# Patient Record
Sex: FEMALE | Race: WHITE | NOT HISPANIC OR LATINO | Employment: FULL TIME | URBAN - METROPOLITAN AREA
[De-identification: names, ages, dates, MRNs, and addresses within clinical notes are randomized per-mention and may not be internally consistent; named-entity substitution may affect disease eponyms.]

---

## 2018-01-11 LAB
INFLUENZA A ANTIGEN, POC: NEGATIVE
INFLUENZA B ANTIGEN, POC: NEGATIVE

## 2021-09-08 ENCOUNTER — HISTORICAL (OUTPATIENT)
Dept: ADMINISTRATIVE | Facility: HOSPITAL | Age: 31
End: 2021-09-08

## 2021-09-08 LAB — SARS-COV-2 RNA RESP QL NAA+PROBE: NEGATIVE

## 2022-04-11 ENCOUNTER — HISTORICAL (OUTPATIENT)
Dept: ADMINISTRATIVE | Facility: HOSPITAL | Age: 32
End: 2022-04-11

## 2022-04-25 VITALS
BODY MASS INDEX: 26.66 KG/M2 | HEIGHT: 65 IN | WEIGHT: 160 LBS | SYSTOLIC BLOOD PRESSURE: 134 MMHG | OXYGEN SATURATION: 100 % | DIASTOLIC BLOOD PRESSURE: 83 MMHG

## 2022-09-23 ENCOUNTER — HISTORICAL (OUTPATIENT)
Dept: ADMINISTRATIVE | Facility: HOSPITAL | Age: 32
End: 2022-09-23

## 2023-11-17 DIAGNOSIS — Z91.89 AT HIGH RISK FOR BREAST CANCER: Primary | ICD-10-CM

## 2023-12-22 RX ORDER — SERTRALINE HYDROCHLORIDE 50 MG/1
50 TABLET, FILM COATED ORAL
COMMUNITY

## 2023-12-22 RX ORDER — LISDEXAMFETAMINE DIMESYLATE 40 MG/1
40 CAPSULE ORAL EVERY MORNING
COMMUNITY

## 2023-12-27 NOTE — PROGRESS NOTES
[facility] Breast Surg  Breast Surgical Oncology  New Patient Office Visit - H&P      Referring Provider: Dr. Kenzie Martinez  PCP: Kenzie Martinez MD   Care Team:  OBGYN: No data on file.    Chief Complaint:   Chief Complaint   Patient presents with    Genetic Evaluation     Patient report no breast related concerns         Subjective:     HPI:  Gretchen Ron is a 33 y.o. female who presents on 2023 for evaluation of risk for breast cancer. Based on the Tyrer-Cuzick Breast Cancer Risk Model, her lifetime risk is calculated to be 26.0%.    Patient is doing well today. She currently denies any breast issues including rashes, redness, pain, swelling, nipple discharge, or new lumps/masses. She has never had any type of screening done for breast cancer thus far.  Patient has been genetically tested over the summer and results were negative.  Patient states she does not perform self-breast exams regularly.  Patient has regular menstrual cycles. She is currently trying to get pregnant.  Patient states she lives a healthy, active lifestyle.  She does not smoke and she drinks alcohol on occasion. Patient works as a teacher.     Imagin2021 BL Breast US at [facility] - negative no evidence of malignancy.  BI-RADS 1.     Pathology:   none    OB/GYN History:  Age at Menarche Onset: 11  Menopausal Status: premenopausal, LMP: Patient's last menstrual period was 12/15/2023 (exact date).  Hysterectomy/Oophorectomy:  None  Hormonal birth control (duration):  8 years  Pregnancy History:   Age at first live birth: 30  Hormone Replacement Therapy: No,  none    Other:  MG breast density: No breast composition recorded.   Prior thoracic RT: none  Genetic testing: Yes. Negative.   Ashkenazi Nondenominational descent: No    Family History:  History reviewed. No pertinent family history.     Patient History:  Past Medical History:   Diagnosis Date    ADHD (attention deficit hyperactivity disorder)   "   Anxiety        History reviewed. No pertinent surgical history.    Social History     Socioeconomic History    Marital status:    Tobacco Use    Smoking status: Never    Smokeless tobacco: Never         There is no immunization history on file for this patient.    Medications/Allergies:    Current Outpatient Medications:     lisdexamfetamine (VYVANSE) 40 MG Cap, Take 40 mg by mouth every morning., Disp: , Rfl:     sertraline (ZOLOFT) 50 MG tablet, Take 50 mg by mouth., Disp: , Rfl:      Review of patient's allergies indicates:   Allergen Reactions    Cefixime Hives    Clarithromycin Hives    Penicillins Hives    Shrimp Hives    Sulfa (sulfonamide antibiotics)        Review of Systems:  All pertinent history in HPI.      Objective:     Vitals:  Vitals:    12/28/23 1017   BP: 126/82   BP Location: Right arm   Patient Position: Sitting   BP Method: Small (Automatic)   Pulse: 76   Resp: 18   Temp: 98.1 °F (36.7 °C)   TempSrc: Oral   SpO2: 100%   Weight: 65.2 kg (143 lb 12.8 oz)   Height: 5' 4" (1.626 m)       Body mass index is 24.68 kg/m².     Physical Exam:  General: The patient is awake, alert and oriented times three. The patient is well nourished and in no acute distress.  Neck: There is no evidence of palpable cervical, supraclavicular or axillary adenopathy. The neck is supple. The thyroid is not enlarged.  Musculoskeletal: The patient has a normal range of motion of her bilateral upper extremities.  Chest: Examination of the chest wall fails to reveal any obvious abnormalities.  The lungs are clear to auscultation bilaterally without rales, rhonchi, or wheezing.  Cardiovascular: The heart has a regular rate and rhythm without murmurs, gallops or rubs.  Breast:   Right:  Examination of right breast fails to reveal any dominant masses or areas of significant focal nodularity. The nipple is everted without evidence of discharge. There is no skin dimpling with movement of the pectoralis. There is no " significant skin changes overlying the breast.   Left:  Examination of the left breast fails to reveal any dominant masses or areas of significant focal nodularity. The nipple is everted without evidence of discharge. There is no skin dimpling with movement of the pectoralis. There are no significant skin changes overlying the breast.  Abdomen: The abdomen is soft, flat, nontender and nondistended with no palpable masses or organomegaly.  Integumentary: no rashes or skin lesions present  Neurologic: cranial nerves intact, no signs of peripheral neurological deficit, motor/sensory function intact    Assessment:     There is no problem list on file for this patient.       Gretchen was seen today for genetic evaluation.    Diagnoses and all orders for this visit:    At high risk for breast cancer  -     Ambulatory referral/consult to Breast Surgery  -     Mammo Digital Screening Bilat w/ Del; Future    Screening mammogram for breast cancer  -     Mammo Digital Screening Bilat w/ Del; Future    Family history of breast cancer  -     Mammo Digital Screening Bilat w/ Del; Future           --------------------------------------------------------------------------------------------------------------  After the initial clinical evaluation nearly 30 minutes were on counseling the patients regarding the options for management. Risk reduction strategies were discussed.     1. Lifestyle factors: As with other types of cancer, studies continue to show that various lifestyle factors may contribute to the development of breast cancer.     Weight: Recent studies have shown that postmenopausal women who are overweight or obese have an increased risk of breast cancer. These women also have a higher risk of having the cancer come back after treatment.     Physical activity: Decreased physical activity is associated with an increased risk of developing breast cancer and a higher risk of having the cancer come back after treatment.  "Regular physical activity may protect against breast cancer by helping women maintain a healthy body weight, lowering hormone levels, or causing changes in a womens metabolism or immune factors.     Alcohol: Current research suggests that having more than 1 to 2 alcoholic drinks, including beer, wine, and spirits, per day raises the risk of breast cancer, as well as the risk of having the cancer come back after treatment.     Food: There is no reliable research that confirms that eating or avoiding specific foods reduces the risk of developing breast cancer or having the cancer come back after treatment. However, eating more fruits and vegetables and fewer animal fats is linked with many health benefits.     2. Prevention:  Surgery to lower cancer risk: For women with BRCA1 or BRCA2 genetic mutations, which substantially increase the risk of breast cancer, preventive removal of the breasts may be considered. The procedure, called a prophylactic mastectomy, appears to reduce the risk of developing breast cancer by at least 95%. Women with these mutations should also consider the preventive removal of the ovaries and fallopian tubes, called a prophylactic salpingo-oophorectomy. This procedure can reduce the risk of developing ovarian cancer, as well as breast cancer, by stopping the ovaries from making estrogen.      Drugs to lower cancer risk (Chemoprevention): Women who have a higher than usual risk of developing breast cancer may consider certain drugs that may help prevent breast cancer. This approach may also be called "chemoprevention." For breast cancer, this is the use of hormone-blocking drugs to reduce cancer risk. The drugs, tamoxifen (Soltamox) and raloxifene (Evista), are approved by the U.S. Food and Drug Administration (FDA) to lower breast cancer risk. These drugs are called selective estrogen receptor modulators (SERMs) and are not chemotherapy. A SERM is a medication that blocks estrogen receptors " in some tissues and not others. Both women who have gone through menopause and those who have not may take tamoxifen. Raloxifene is only approved for women who have gone through menopause. Each drug also has different side effects.     Aromatase inhibitors (AIs) have also been shown to lower breast cancer risk. AIs are a type of hormone-blocking treatment that reduces the amount of estrogen in a woman's body by stopping tissues and organs other than the ovaries from producing estrogen. They can only be used by women who have gone through menopause. However, no AIs have been approved by the FDA for lowering breast cancer risk in women who do not have the disease.     3. Surveillance: Women with a known genetic mutation should follow screening guidelines per the NCCN guidelines. Women with no known genetic mutation  and found to be at greater than 20 percent average lifetime risk of breast cancer are recommended for the following screening recommendations:     Clinical Breast exam: Every 6-12 months starting at age found to be at increased risk by risk model     Mammogram: Per NCCN guidelines, recommended every year starting 10 years younger than the youngest breast cancer case in the family (but not before age 30). May consider beginning breast MRIs at age 30 per ACR guidelines if desired by patient or other clinical considerations. May also consider getting a baseline MG at time of initial high risk consultation, if not already obtained.     Breast MRI: Per NCCN guidelines, recommended every year starting 10 years younger than the youngest breast cancer case in the family (but not before age 25). May consider beginning breast MRIs at age 30 per ACR guidelines if desired by patient or other clinical considerations. If patient has a first-degree relative with a BRCA1/2 gene mutation, youre encouraged to get genetic counseling and/or testing before getting MRI as part of screening (for those who do not wish to have  genetic testing, MRI is recommended). Breast MRI in combination with mammography is better than mammography alone at finding breast cancer in certain women at higher than average risk.     --------------------------------------------------------------------------------------------------------------     Plan:         1. Lifestyle - Healthy lifestyle guidelines were reviewed. She was encouraged to engage in regular exercise, maintain a healthy body weight, and avoid excessive alcohol consumption. Healthy nutritional guidelines were also discussed. Self-breast examination was reviewed with the patient in detail and she was encouraged to perform this on a monthly basis.    2. Surveillance - She desires undergoing high risk screening with annual screening mammograms and breast MRIs.  Patient had a maternal aunt who was 35 at the time of breast cancer diagnosis. Thus, according to NCCN guidelines, patient should start undergoing screening now.  In the absence of significant clinical findings in the interval, I recommend screening mammogram next available.  Patient is actively trying to get pregnant, so we will hold off on proceeding with breast MRIs for the time being.     3. Follow up - RTC in 1 year.     4. Prevention - We had a brief discussion/education about indications for preventative mastectomy or chemoprevention.  These methods are not recommended to her at this time.    5. Genetics - She has already had genetic testing done, and it was negative.     6. Other routine screening exams:   -  Recommend annual follow up with PCP   -  Recommend annual follow up with GYN for pap smears/gynecologic exams and CBE.  Patient has her annual gyn visit in the summer, so she will continue to see us in the winter in order to keep CBE q 6 months schedule.        All of her questions were answered. She was advised to call if she develops any questions or concerns.    Gretchen Thomas PA-C      --------------------------------------------------------------------------------------------------------------  Total time on the date of the visit ranged from 60-74 mins (23510). Total time includes both face-to-face and non-face-to-face time personally spent by myself on the day of the visit.    Non-face-to-face time included:  _X_ preparing to see the patient such as reviewing the patient record  _X_ obtaining and reviewing separately obtained history  _X_ independently interpreting results  _X_ documenting clinical information in electronic health record.    Face-to-face time included:  _X_ performing an appropriate history and examination  _X_ communicating results to the patient  _X_ counseling and educating the patient  __ ordering appropriate medications  _x_ ordering appropriate tests  _X_ ordering appropriate procedures (including follow-up)  _X_ answering any questions the patient had    Total Time spent on date of visit: 60 minutes

## 2023-12-28 ENCOUNTER — OFFICE VISIT (OUTPATIENT)
Dept: SURGERY | Facility: CLINIC | Age: 33
End: 2023-12-28
Payer: COMMERCIAL

## 2023-12-28 VITALS
SYSTOLIC BLOOD PRESSURE: 126 MMHG | RESPIRATION RATE: 18 BRPM | TEMPERATURE: 98 F | DIASTOLIC BLOOD PRESSURE: 82 MMHG | HEART RATE: 76 BPM | OXYGEN SATURATION: 100 % | BODY MASS INDEX: 24.55 KG/M2 | HEIGHT: 64 IN | WEIGHT: 143.81 LBS

## 2023-12-28 DIAGNOSIS — Z91.89 AT HIGH RISK FOR BREAST CANCER: Primary | ICD-10-CM

## 2023-12-28 DIAGNOSIS — Z12.31 SCREENING MAMMOGRAM FOR BREAST CANCER: ICD-10-CM

## 2023-12-28 DIAGNOSIS — Z80.3 FAMILY HISTORY OF BREAST CANCER: ICD-10-CM

## 2023-12-28 PROCEDURE — 3074F SYST BP LT 130 MM HG: CPT | Mod: CPTII,S$GLB,,

## 2023-12-28 PROCEDURE — 3079F PR MOST RECENT DIASTOLIC BLOOD PRESSURE 80-89 MM HG: ICD-10-PCS | Mod: CPTII,S$GLB,,

## 2023-12-28 PROCEDURE — 3079F DIAST BP 80-89 MM HG: CPT | Mod: CPTII,S$GLB,,

## 2023-12-28 PROCEDURE — 99999 PR PBB SHADOW E&M-EST. PATIENT-LVL IV: ICD-10-PCS | Mod: PBBFAC,,,

## 2023-12-28 PROCEDURE — 3008F PR BODY MASS INDEX (BMI) DOCUMENTED: ICD-10-PCS | Mod: CPTII,S$GLB,,

## 2023-12-28 PROCEDURE — 3074F PR MOST RECENT SYSTOLIC BLOOD PRESSURE < 130 MM HG: ICD-10-PCS | Mod: CPTII,S$GLB,,

## 2023-12-28 PROCEDURE — 1160F PR REVIEW ALL MEDS BY PRESCRIBER/CLIN PHARMACIST DOCUMENTED: ICD-10-PCS | Mod: CPTII,S$GLB,,

## 2023-12-28 PROCEDURE — 1160F RVW MEDS BY RX/DR IN RCRD: CPT | Mod: CPTII,S$GLB,,

## 2023-12-28 PROCEDURE — 99205 PR OFFICE/OUTPT VISIT, NEW, LEVL V, 60-74 MIN: ICD-10-PCS | Mod: S$GLB,,,

## 2023-12-28 PROCEDURE — 3008F BODY MASS INDEX DOCD: CPT | Mod: CPTII,S$GLB,,

## 2023-12-28 PROCEDURE — 1159F MED LIST DOCD IN RCRD: CPT | Mod: CPTII,S$GLB,,

## 2023-12-28 PROCEDURE — 99999 PR PBB SHADOW E&M-EST. PATIENT-LVL IV: CPT | Mod: PBBFAC,,,

## 2023-12-28 PROCEDURE — 99205 OFFICE O/P NEW HI 60 MIN: CPT | Mod: S$GLB,,,

## 2023-12-28 PROCEDURE — 1159F PR MEDICATION LIST DOCUMENTED IN MEDICAL RECORD: ICD-10-PCS | Mod: CPTII,S$GLB,,

## 2024-06-05 LAB
C TRACH RRNA SPEC QL PROBE: NEGATIVE
HBV SURFACE AG SERPL QL IA: NEGATIVE
HCV AB SERPL QL IA: NEGATIVE
HIV 1+2 AB+HIV1 P24 AG SERPL QL IA: NEGATIVE
N GONORRHOEAE, AMPLIFIED DNA: NEGATIVE
RPR: NONREACTIVE
RUBELLA IMMUNE STATUS: NORMAL

## 2024-10-27 ENCOUNTER — HOSPITAL ENCOUNTER (OUTPATIENT)
Facility: HOSPITAL | Age: 34
Discharge: HOME | End: 2024-10-28
Attending: CHIROPRACTOR | Admitting: OBSTETRICS & GYNECOLOGY
Payer: COMMERCIAL

## 2024-10-27 DIAGNOSIS — O47.9 UTERINE CONTRACTIONS: ICD-10-CM

## 2024-10-27 DIAGNOSIS — R87.89 POSITIVE FETAL FIBRONECTIN AT 22 WEEKS TO 34 WEEKS GESTATION: ICD-10-CM

## 2024-10-27 DIAGNOSIS — Z3A.29 29 WEEKS GESTATION OF PREGNANCY: Primary | ICD-10-CM

## 2024-10-27 DIAGNOSIS — O09.899 POSITIVE FETAL FIBRONECTIN AT 22 WEEKS TO 34 WEEKS GESTATION: ICD-10-CM

## 2024-10-27 PROBLEM — O60.00 PRETERM LABOR: Status: ACTIVE | Noted: 2024-10-27

## 2024-10-27 LAB
BACTERIA #/AREA URNS AUTO: ABNORMAL /HPF
BASOPHILS # BLD AUTO: 0.04 X10(3)/MCL
BASOPHILS NFR BLD AUTO: 0.3 %
BILIRUB UR QL STRIP.AUTO: NEGATIVE
CLARITY UR: ABNORMAL
COLOR UR AUTO: ABNORMAL
EOSINOPHIL # BLD AUTO: 0.02 X10(3)/MCL (ref 0–0.9)
EOSINOPHIL NFR BLD AUTO: 0.1 %
ERYTHROCYTE [DISTWIDTH] IN BLOOD BY AUTOMATED COUNT: 12.1 % (ref 11.5–17)
FETAL FIBRONECTIN (OHS): POSITIVE
GLUCOSE UR QL STRIP: NORMAL
GROUP & RH: NORMAL
HCT VFR BLD AUTO: 31.9 % (ref 37–47)
HGB BLD-MCNC: 10.9 G/DL (ref 12–16)
HGB UR QL STRIP: ABNORMAL
IMM GRANULOCYTES # BLD AUTO: 0.15 X10(3)/MCL (ref 0–0.04)
IMM GRANULOCYTES NFR BLD AUTO: 1.1 %
INDIRECT COOMBS: NORMAL
KETONES UR QL STRIP: NEGATIVE
LEUKOCYTE ESTERASE UR QL STRIP: 250
LYMPHOCYTES # BLD AUTO: 1.51 X10(3)/MCL (ref 0.6–4.6)
LYMPHOCYTES NFR BLD AUTO: 10.8 %
MCH RBC QN AUTO: 30.1 PG (ref 27–31)
MCHC RBC AUTO-ENTMCNC: 34.2 G/DL (ref 33–36)
MCV RBC AUTO: 88.1 FL (ref 80–94)
MONOCYTES # BLD AUTO: 0.89 X10(3)/MCL (ref 0.1–1.3)
MONOCYTES NFR BLD AUTO: 6.4 %
MUCOUS THREADS URNS QL MICRO: ABNORMAL /LPF
NEUTROPHILS # BLD AUTO: 11.35 X10(3)/MCL (ref 2.1–9.2)
NEUTROPHILS NFR BLD AUTO: 81.3 %
NITRITE UR QL STRIP: NEGATIVE
NRBC BLD AUTO-RTO: 0 %
PH UR STRIP: 6.5 [PH]
PLATELET # BLD AUTO: 170 X10(3)/MCL (ref 130–400)
PMV BLD AUTO: 10.4 FL (ref 7.4–10.4)
PROT UR QL STRIP: NEGATIVE
RBC # BLD AUTO: 3.62 X10(6)/MCL (ref 4.2–5.4)
RBC #/AREA URNS AUTO: ABNORMAL /HPF
SP GR UR STRIP.AUTO: 1.01 (ref 1–1.03)
SPECIMEN OUTDATE: NORMAL
SQUAMOUS #/AREA URNS LPF: ABNORMAL /HPF
T PALLIDUM AB SER QL: NONREACTIVE
UROBILINOGEN UR STRIP-ACNC: NORMAL
WBC # BLD AUTO: 13.96 X10(3)/MCL (ref 4.5–11.5)
WBC #/AREA URNS AUTO: ABNORMAL /HPF

## 2024-10-27 PROCEDURE — 63600175 PHARM REV CODE 636 W HCPCS: Performed by: OBSTETRICS & GYNECOLOGY

## 2024-10-27 PROCEDURE — 36415 COLL VENOUS BLD VENIPUNCTURE: CPT | Performed by: OBSTETRICS & GYNECOLOGY

## 2024-10-27 PROCEDURE — 82731 ASSAY OF FETAL FIBRONECTIN: CPT | Performed by: OBSTETRICS & GYNECOLOGY

## 2024-10-27 PROCEDURE — 86780 TREPONEMA PALLIDUM: CPT | Performed by: OBSTETRICS & GYNECOLOGY

## 2024-10-27 PROCEDURE — 86850 RBC ANTIBODY SCREEN: CPT | Performed by: OBSTETRICS & GYNECOLOGY

## 2024-10-27 PROCEDURE — 81001 URINALYSIS AUTO W/SCOPE: CPT | Performed by: OBSTETRICS & GYNECOLOGY

## 2024-10-27 PROCEDURE — 85025 COMPLETE CBC W/AUTO DIFF WBC: CPT | Performed by: OBSTETRICS & GYNECOLOGY

## 2024-10-27 PROCEDURE — G0378 HOSPITAL OBSERVATION PER HR: HCPCS

## 2024-10-27 PROCEDURE — 51702 INSERT TEMP BLADDER CATH: CPT

## 2024-10-27 PROCEDURE — 86901 BLOOD TYPING SEROLOGIC RH(D): CPT | Performed by: OBSTETRICS & GYNECOLOGY

## 2024-10-27 PROCEDURE — 86900 BLOOD TYPING SEROLOGIC ABO: CPT | Performed by: OBSTETRICS & GYNECOLOGY

## 2024-10-27 PROCEDURE — 25000003 PHARM REV CODE 250: Performed by: OBSTETRICS & GYNECOLOGY

## 2024-10-27 PROCEDURE — 96372 THER/PROPH/DIAG INJ SC/IM: CPT | Performed by: OBSTETRICS & GYNECOLOGY

## 2024-10-27 RX ORDER — CALCIUM GLUCONATE 98 MG/ML
1 INJECTION, SOLUTION INTRAVENOUS
Status: DISCONTINUED | OUTPATIENT
Start: 2024-10-27 | End: 2024-10-28 | Stop reason: HOSPADM

## 2024-10-27 RX ORDER — MAGNESIUM SULFATE HEPTAHYDRATE 40 MG/ML
1 INJECTION, SOLUTION INTRAVENOUS CONTINUOUS
Status: DISCONTINUED | OUTPATIENT
Start: 2024-10-27 | End: 2024-10-28

## 2024-10-27 RX ORDER — ONDANSETRON HYDROCHLORIDE 2 MG/ML
4 INJECTION, SOLUTION INTRAVENOUS EVERY 6 HOURS PRN
Status: DISCONTINUED | OUTPATIENT
Start: 2024-10-27 | End: 2024-10-28 | Stop reason: HOSPADM

## 2024-10-27 RX ORDER — MAGNESIUM SULFATE HEPTAHYDRATE 40 MG/ML
6 INJECTION, SOLUTION INTRAVENOUS ONCE
Status: COMPLETED | OUTPATIENT
Start: 2024-10-27 | End: 2024-10-27

## 2024-10-27 RX ORDER — BETAMETHASONE SODIUM PHOSPHATE AND BETAMETHASONE ACETATE 3; 3 MG/ML; MG/ML
12 INJECTION, SUSPENSION INTRA-ARTICULAR; INTRALESIONAL; INTRAMUSCULAR; SOFT TISSUE ONCE
Status: DISCONTINUED | OUTPATIENT
Start: 2024-10-28 | End: 2024-10-28

## 2024-10-27 RX ORDER — NIFEDIPINE 10 MG/1
20 CAPSULE ORAL EVERY 8 HOURS
Status: DISCONTINUED | OUTPATIENT
Start: 2024-10-27 | End: 2024-10-27

## 2024-10-27 RX ORDER — SODIUM CHLORIDE, SODIUM LACTATE, POTASSIUM CHLORIDE, CALCIUM CHLORIDE 600; 310; 30; 20 MG/100ML; MG/100ML; MG/100ML; MG/100ML
1000 INJECTION, SOLUTION INTRAVENOUS CONTINUOUS
Status: DISCONTINUED | OUTPATIENT
Start: 2024-10-27 | End: 2024-10-28 | Stop reason: HOSPADM

## 2024-10-27 RX ORDER — ZOLPIDEM TARTRATE 5 MG/1
10 TABLET ORAL NIGHTLY PRN
Status: DISCONTINUED | OUTPATIENT
Start: 2024-10-27 | End: 2024-10-28 | Stop reason: HOSPADM

## 2024-10-27 RX ORDER — NIFEDIPINE 10 MG/1
20 CAPSULE ORAL EVERY 8 HOURS
Status: DISCONTINUED | OUTPATIENT
Start: 2024-10-28 | End: 2024-10-28 | Stop reason: HOSPADM

## 2024-10-27 RX ORDER — BETAMETHASONE SODIUM PHOSPHATE AND BETAMETHASONE ACETATE 3; 3 MG/ML; MG/ML
12 INJECTION, SUSPENSION INTRA-ARTICULAR; INTRALESIONAL; INTRAMUSCULAR; SOFT TISSUE ONCE
Status: COMPLETED | OUTPATIENT
Start: 2024-10-27 | End: 2024-10-27

## 2024-10-27 RX ORDER — SODIUM CHLORIDE, SODIUM LACTATE, POTASSIUM CHLORIDE, CALCIUM CHLORIDE 600; 310; 30; 20 MG/100ML; MG/100ML; MG/100ML; MG/100ML
INJECTION, SOLUTION INTRAVENOUS CONTINUOUS
Status: DISCONTINUED | OUTPATIENT
Start: 2024-10-27 | End: 2024-10-28 | Stop reason: HOSPADM

## 2024-10-27 RX ADMIN — BETAMETHASONE SODIUM PHOSPHATE AND BETAMETHASONE ACETATE 12 MG: 3; 3 INJECTION, SUSPENSION INTRA-ARTICULAR; INTRALESIONAL; INTRAMUSCULAR at 05:10

## 2024-10-27 RX ADMIN — ZOLPIDEM TARTRATE 10 MG: 5 TABLET ORAL at 10:10

## 2024-10-27 RX ADMIN — NIFEDIPINE 20 MG: 10 CAPSULE ORAL at 06:10

## 2024-10-27 RX ADMIN — MAGNESIUM SULFATE HEPTAHYDRATE 6 G: 40 INJECTION, SOLUTION INTRAVENOUS at 05:10

## 2024-10-27 RX ADMIN — ONDANSETRON 4 MG: 2 INJECTION INTRAMUSCULAR; INTRAVENOUS at 05:10

## 2024-10-27 RX ADMIN — SODIUM CHLORIDE, POTASSIUM CHLORIDE, SODIUM LACTATE AND CALCIUM CHLORIDE: 600; 310; 30; 20 INJECTION, SOLUTION INTRAVENOUS at 05:10

## 2024-10-28 VITALS
HEART RATE: 78 BPM | SYSTOLIC BLOOD PRESSURE: 116 MMHG | OXYGEN SATURATION: 100 % | RESPIRATION RATE: 17 BRPM | BODY MASS INDEX: 29.16 KG/M2 | HEIGHT: 65 IN | DIASTOLIC BLOOD PRESSURE: 57 MMHG | WEIGHT: 175 LBS | TEMPERATURE: 98 F

## 2024-10-28 PROBLEM — Z3A.29 29 WEEKS GESTATION OF PREGNANCY: Status: ACTIVE | Noted: 2024-10-28

## 2024-10-28 LAB — MAGNESIUM SERPL-MCNC: 3.6 MG/DL (ref 1.6–2.6)

## 2024-10-28 PROCEDURE — 63600175 PHARM REV CODE 636 W HCPCS: Performed by: OBSTETRICS & GYNECOLOGY

## 2024-10-28 PROCEDURE — 83735 ASSAY OF MAGNESIUM: CPT | Performed by: OBSTETRICS & GYNECOLOGY

## 2024-10-28 PROCEDURE — 96372 THER/PROPH/DIAG INJ SC/IM: CPT | Performed by: OBSTETRICS & GYNECOLOGY

## 2024-10-28 PROCEDURE — G0378 HOSPITAL OBSERVATION PER HR: HCPCS

## 2024-10-28 RX ORDER — BETAMETHASONE SODIUM PHOSPHATE AND BETAMETHASONE ACETATE 3; 3 MG/ML; MG/ML
12 INJECTION, SUSPENSION INTRA-ARTICULAR; INTRALESIONAL; INTRAMUSCULAR; SOFT TISSUE ONCE
Status: COMPLETED | OUTPATIENT
Start: 2024-10-28 | End: 2024-10-28

## 2024-10-28 RX ADMIN — BETAMETHASONE SODIUM PHOSPHATE AND BETAMETHASONE ACETATE 12 MG: 3; 3 INJECTION, SUSPENSION INTRA-ARTICULAR; INTRALESIONAL; INTRAMUSCULAR at 01:10

## 2024-10-30 ENCOUNTER — HOSPITAL ENCOUNTER (INPATIENT)
Facility: HOSPITAL | Age: 34
LOS: 7 days | Discharge: HOME | End: 2024-11-08
Attending: OBSTETRICS & GYNECOLOGY | Admitting: OBSTETRICS & GYNECOLOGY
Payer: COMMERCIAL

## 2024-10-30 DIAGNOSIS — R87.89 POSITIVE FETAL FIBRONECTIN AT 22 WEEKS TO 34 WEEKS GESTATION: ICD-10-CM

## 2024-10-30 DIAGNOSIS — O26.879 SHORT CERVIX AFFECTING PREGNANCY: ICD-10-CM

## 2024-10-30 DIAGNOSIS — Z3A.29 29 WEEKS GESTATION OF PREGNANCY: ICD-10-CM

## 2024-10-30 DIAGNOSIS — O60.00 PRETERM LABOR: ICD-10-CM

## 2024-10-30 DIAGNOSIS — O09.899 POSITIVE FETAL FIBRONECTIN AT 22 WEEKS TO 34 WEEKS GESTATION: ICD-10-CM

## 2024-10-30 DIAGNOSIS — O36.8390 NON-REASSURING FETAL HEART RATE OR RHYTHM AFFECTING MOTHER: ICD-10-CM

## 2024-10-30 DIAGNOSIS — R06.02 SHORTNESS OF BREATH: ICD-10-CM

## 2024-10-30 DIAGNOSIS — O42.919 PRETERM PREMATURE RUPTURE OF MEMBRANES: ICD-10-CM

## 2024-10-30 LAB
BASOPHILS # BLD AUTO: 0.03 X10(3)/MCL
BASOPHILS NFR BLD AUTO: 0.2 %
EOSINOPHIL # BLD AUTO: 0.02 X10(3)/MCL (ref 0–0.9)
EOSINOPHIL NFR BLD AUTO: 0.1 %
ERYTHROCYTE [DISTWIDTH] IN BLOOD BY AUTOMATED COUNT: 12.1 % (ref 11.5–17)
GROUP & RH: NORMAL
HCT VFR BLD AUTO: 32.6 % (ref 37–47)
HGB BLD-MCNC: 10.8 G/DL (ref 12–16)
IMM GRANULOCYTES # BLD AUTO: 0.27 X10(3)/MCL (ref 0–0.04)
IMM GRANULOCYTES NFR BLD AUTO: 2 %
INDIRECT COOMBS: NORMAL
LYMPHOCYTES # BLD AUTO: 1.68 X10(3)/MCL (ref 0.6–4.6)
LYMPHOCYTES NFR BLD AUTO: 12.6 %
MCH RBC QN AUTO: 29.4 PG (ref 27–31)
MCHC RBC AUTO-ENTMCNC: 33.1 G/DL (ref 33–36)
MCV RBC AUTO: 88.8 FL (ref 80–94)
MONOCYTES # BLD AUTO: 0.8 X10(3)/MCL (ref 0.1–1.3)
MONOCYTES NFR BLD AUTO: 6 %
NEUTROPHILS # BLD AUTO: 10.54 X10(3)/MCL (ref 2.1–9.2)
NEUTROPHILS NFR BLD AUTO: 79.1 %
NRBC BLD AUTO-RTO: 0 %
PLATELET # BLD AUTO: 199 X10(3)/MCL (ref 130–400)
PMV BLD AUTO: 10.8 FL (ref 7.4–10.4)
RBC # BLD AUTO: 3.67 X10(6)/MCL (ref 4.2–5.4)
SPECIMEN OUTDATE: NORMAL
T PALLIDUM AB SER QL: NONREACTIVE
WBC # BLD AUTO: 13.34 X10(3)/MCL (ref 4.5–11.5)

## 2024-10-30 PROCEDURE — 86900 BLOOD TYPING SEROLOGIC ABO: CPT | Performed by: OBSTETRICS & GYNECOLOGY

## 2024-10-30 PROCEDURE — 85025 COMPLETE CBC W/AUTO DIFF WBC: CPT | Performed by: OBSTETRICS & GYNECOLOGY

## 2024-10-30 PROCEDURE — 99233 SBSQ HOSP IP/OBS HIGH 50: CPT | Mod: ,,, | Performed by: NURSE PRACTITIONER

## 2024-10-30 PROCEDURE — 63600175 PHARM REV CODE 636 W HCPCS: Performed by: OBSTETRICS & GYNECOLOGY

## 2024-10-30 PROCEDURE — 51702 INSERT TEMP BLADDER CATH: CPT

## 2024-10-30 PROCEDURE — 86780 TREPONEMA PALLIDUM: CPT | Performed by: OBSTETRICS & GYNECOLOGY

## 2024-10-30 PROCEDURE — G0378 HOSPITAL OBSERVATION PER HR: HCPCS

## 2024-10-30 PROCEDURE — G0379 DIRECT REFER HOSPITAL OBSERV: HCPCS

## 2024-10-30 RX ORDER — MAGNESIUM SULFATE HEPTAHYDRATE 40 MG/ML
6 INJECTION, SOLUTION INTRAVENOUS ONCE
Status: DISCONTINUED | OUTPATIENT
Start: 2024-10-30 | End: 2024-10-30

## 2024-10-30 RX ORDER — MAGNESIUM SULFATE HEPTAHYDRATE 40 MG/ML
6 INJECTION, SOLUTION INTRAVENOUS ONCE
Status: COMPLETED | OUTPATIENT
Start: 2024-10-30 | End: 2024-10-30

## 2024-10-30 RX ORDER — MAGNESIUM SULFATE HEPTAHYDRATE 40 MG/ML
4 INJECTION, SOLUTION INTRAVENOUS ONCE
Status: DISCONTINUED | OUTPATIENT
Start: 2024-10-30 | End: 2024-10-30

## 2024-10-30 RX ORDER — MAGNESIUM SULFATE HEPTAHYDRATE 40 MG/ML
2 INJECTION, SOLUTION INTRAVENOUS CONTINUOUS
Status: DISCONTINUED | OUTPATIENT
Start: 2024-10-30 | End: 2024-10-30

## 2024-10-30 RX ORDER — ONDANSETRON HYDROCHLORIDE 2 MG/ML
8 INJECTION, SOLUTION INTRAVENOUS ONCE
Status: COMPLETED | OUTPATIENT
Start: 2024-10-30 | End: 2024-10-30

## 2024-10-30 RX ORDER — CALCIUM GLUCONATE 98 MG/ML
1 INJECTION, SOLUTION INTRAVENOUS
Status: DISCONTINUED | OUTPATIENT
Start: 2024-10-30 | End: 2024-10-31

## 2024-10-30 RX ORDER — MAGNESIUM SULFATE HEPTAHYDRATE 40 MG/ML
2 INJECTION, SOLUTION INTRAVENOUS CONTINUOUS
Status: DISCONTINUED | OUTPATIENT
Start: 2024-10-30 | End: 2024-10-31

## 2024-10-30 RX ADMIN — ONDANSETRON 8 MG: 2 INJECTION INTRAMUSCULAR; INTRAVENOUS at 12:10

## 2024-10-30 RX ADMIN — MAGNESIUM SULFATE HEPTAHYDRATE 6 G: 40 INJECTION, SOLUTION INTRAVENOUS at 12:10

## 2024-10-30 NOTE — CONSULTS
Consultation Note  Maternal Fetal Medicine          Subjective:         Gretchen Ron is a 34 y.o.  female with olson IUP at 29w4d who is admitted for short cervix, contractions.    She reports experiencing contractions over the weekend which led her BHUMIKA. She had a positive FFN, regular contraction pattern noted, and TVU of 3cm. UA collected and did not meet criteria to reflex for culture. She was admitted for a few days where she received neuromagnesium and ANCS. After uterine quiescence, she was discharged to home with close follow up scheduled with her primary OB. She went to her OB today for transvaginal ultrasound. Progressive cervical shortening noted with a CL of 1cm and funneling. Upon further questioning, she reported feeling frequent tightening so she was sent to the hospital for monitoring.    Upon arrival, she was noted to have a regular contraction pattern and neuromag was ordered to be repeated. FHR decels noted at the time of regular contractions with spontaneous recovery. Since Mag initiation, contractions have decreased in frequency. No further decels noted.   She has SCDs on bilat LE.       Review of Systems   Constitutional: Negative for fever, chills, malaise, and fatigue.   HENT: Negative for headache.   Eyes: Negative for visual disturbances.   Respiratory: Negative for cough and shortness of breath.    Cardiovascular: Negative for chest pain, edema.  Gastrointestinal:  Negative for abd pain, N/V/D. Negative for constipation.   Obstetrical: Negative for vaginal leaking/bleeding. Positive for contractions.   Genitourinary: Negative for dysuria, frequency, urgency.   Musculoskeletal: Negative.    Neurological: Negative.    Psychiatric/Behavioral: Negative.        PMHx:   Past Medical History:   Diagnosis Date    ADHD (attention deficit hyperactivity disorder)     Anxiety        PSHx: History reviewed. No pertinent surgical history.    All:   Review of patient's allergies indicates:    Allergen Reactions    Cefixime Hives    Clarithromycin Hives    Penicillins Hives    Shrimp Hives    Sulfa (sulfonamide antibiotics)        Meds:   Medications Prior to Admission   Medication Sig Dispense Refill Last Dose/Taking    prenatal vit/iron fum/folic ac (PRENATAL 1+1 ORAL) Take by mouth.   10/30/2024    sertraline (ZOLOFT) 50 MG tablet Take 50 mg by mouth.   10/30/2024       SH:   Social History     Socioeconomic History    Marital status:    Tobacco Use    Smoking status: Never    Smokeless tobacco: Never   Substance and Sexual Activity    Alcohol use: Not Currently    Drug use: Never    Sexual activity: Yes       FH: No family history on file.    OBHx:   OB History    Para Term  AB Living   3 1 1 0 1 1   SAB IAB Ectopic Multiple Live Births   1 0 0 0 1      # Outcome Date GA Lbr Darren/2nd Weight Sex Type Anes PTL Lv   3 Current            2 Term 23    M Vag-Spont   STEVE   1 SAB 2019     SAB          Objective:         Temp:  [98.8 °F (37.1 °C)] 98.8 °F (37.1 °C)  Pulse:  [72-84] 83  Resp:  [16-18] 18  SpO2:  [99 %-100 %] 99 %  BP: ()/(62-70) 116/63    Gen: NAD, A&Ox3  Pulm: Unlabored breathing, LCTAB  Card: RRR  Abd: FHT present, soft, nondistended, nontender to palpation, gravid uterus palpable c/w gestational age  Extremities: Palpable peripheral pulses, no pedal edema, 2+ DTRs x 4    NST: 140 baseline, moderate BTBV, pos accelerations, neg decelerations currently; had intermittent decels upon arrival when ctx pattern was regular  [place]: 4 in past hour  Limited OB ultrasound on 10/27/24: vertex presentation, anterior placenta, ALANNA 16cm, TVU-CL 3cm    Lab Review  Blood Type: A POS      Recent Results (from the past 24 hours)   SYPHILIS ANTIBODY (WITH REFLEX RPR)    Collection Time: 10/30/24  1:07 PM   Result Value Ref Range    Syphilis Antibody Nonreactive Nonreactive, Equivocal   CBC with Differential    Collection Time: 10/30/24  1:07 PM   Result Value Ref Range    WBC  13.34 (H) 4.50 - 11.50 x10(3)/mcL    RBC 3.67 (L) 4.20 - 5.40 x10(6)/mcL    Hgb 10.8 (L) 12.0 - 16.0 g/dL    Hct 32.6 (L) 37.0 - 47.0 %    MCV 88.8 80.0 - 94.0 fL    MCH 29.4 27.0 - 31.0 pg    MCHC 33.1 33.0 - 36.0 g/dL    RDW 12.1 11.5 - 17.0 %    Platelet 199 130 - 400 x10(3)/mcL    MPV 10.8 (H) 7.4 - 10.4 fL    Neut % 79.1 %    Lymph % 12.6 %    Mono % 6.0 %    Eos % 0.1 %    Basophil % 0.2 %    Lymph # 1.68 0.6 - 4.6 x10(3)/mcL    Neut # 10.54 (H) 2.1 - 9.2 x10(3)/mcL    Mono # 0.80 0.1 - 1.3 x10(3)/mcL    Eos # 0.02 0 - 0.9 x10(3)/mcL    Baso # 0.03 <=0.2 x10(3)/mcL    IG# 0.27 (H) 0 - 0.04 x10(3)/mcL    IG% 2.0 %    NRBC% 0.0 %       Assessment:       34 y.o.  at 29w4d weeks gestation admitted for  contractions    Active Hospital Problems    Diagnosis  POA    *29 weeks gestation of pregnancy [Z3A.29]  Not Applicable    Short cervix affecting pregnancy [O26.879]  Unknown     labor [O60.00]  Yes    Positive fetal fibronectin at 22 weeks to 34 weeks gestation [O09.899, R87.89]  Not Applicable      Resolved Hospital Problems   No resolved problems to display.        Plan:     1.  contractions  Patient is admitted with  contractions. The patient was counseled regarding the need for inpatient management due to regular contraction pattern on admit. We discussed the risk of progression to  labor and ultimately delivery. Additionally, we discussed  complications of delivering at this gestational age.     Recommendations:  Administer a course of steroids for fetal lung maturity (completed)  If gestational age is < 32 weeks and concern for imminent delivery, consider initiating magnesium sulfate for neuroprotection - currently infusing. Has indwelling freeman in place. Ok to discontinue and measure urine output. If d/c Freeman, must use bedside commode next to bed to void.  This is administered with an initial loading dose of 6 grams of magnesium sulfate, followed by an  infusion rate of 2 grams/hour for 12 hours.   Magnesium sulfate should be re-initiated (with a 6 gram loading dose if it has been > 6 hours since discontinuing a prior course) if delivery is thought to be imminent prior to 32 weeks gestation.  May use Nifedipine 10mg PRN for tocolysis. Do not recommend utilizing Indocin at this time.   Obtain estimated fetal weight, assess fetal presentation with ultrasound - will plan for AM  Continuous EFM/[place] at this time.   UA completed three days ago - did not meet criteria for culture. Denies symptoms.    2. FWB  - currently on continuous monitoring d/t neuromag   - Had intermittent decels upon admit, however, reassuring at this time    Thank you for allowing us to participate in the care of your patient. If you have any questions/concerns, please do not hesitate to contact us.       Maryann Castro, MSN, APRN, WHNP-BC  Maternal Fetal Medicine

## 2024-10-31 PROBLEM — O42.919 PRETERM PREMATURE RUPTURE OF MEMBRANES: Status: ACTIVE | Noted: 2024-10-31

## 2024-10-31 LAB
CTP QC/QA: YES
RUPTURE OF MEMBRANE: POSITIVE

## 2024-10-31 PROCEDURE — 63600175 PHARM REV CODE 636 W HCPCS: Mod: JZ,JG | Performed by: NURSE PRACTITIONER

## 2024-10-31 PROCEDURE — 25000003 PHARM REV CODE 250: Performed by: NURSE PRACTITIONER

## 2024-10-31 PROCEDURE — G0378 HOSPITAL OBSERVATION PER HR: HCPCS

## 2024-10-31 PROCEDURE — 63600175 PHARM REV CODE 636 W HCPCS: Performed by: NURSE PRACTITIONER

## 2024-10-31 PROCEDURE — 87653 STREP B DNA AMP PROBE: CPT | Performed by: NURSE PRACTITIONER

## 2024-10-31 PROCEDURE — 87081 CULTURE SCREEN ONLY: CPT | Performed by: NURSE PRACTITIONER

## 2024-10-31 PROCEDURE — 25000003 PHARM REV CODE 250: Performed by: OBSTETRICS & GYNECOLOGY

## 2024-10-31 PROCEDURE — 99233 SBSQ HOSP IP/OBS HIGH 50: CPT | Mod: ,,, | Performed by: OBSTETRICS & GYNECOLOGY

## 2024-10-31 RX ORDER — MAGNESIUM SULFATE HEPTAHYDRATE 40 MG/ML
2 INJECTION, SOLUTION INTRAVENOUS CONTINUOUS
Status: DISCONTINUED | OUTPATIENT
Start: 2024-10-31 | End: 2024-11-05

## 2024-10-31 RX ORDER — SODIUM CHLORIDE, SODIUM LACTATE, POTASSIUM CHLORIDE, CALCIUM CHLORIDE 600; 310; 30; 20 MG/100ML; MG/100ML; MG/100ML; MG/100ML
1000 INJECTION, SOLUTION INTRAVENOUS CONTINUOUS
Status: ACTIVE | OUTPATIENT
Start: 2024-10-31 | End: 2024-10-31

## 2024-10-31 RX ORDER — TERBUTALINE SULFATE 1 MG/ML
0.25 INJECTION SUBCUTANEOUS ONCE
Status: COMPLETED | OUTPATIENT
Start: 2024-10-31 | End: 2024-10-31

## 2024-10-31 RX ORDER — CLINDAMYCIN PHOSPHATE 900 MG/50ML
900 INJECTION, SOLUTION INTRAVENOUS
Status: COMPLETED | OUTPATIENT
Start: 2024-10-31 | End: 2024-11-01

## 2024-10-31 RX ORDER — DOCUSATE SODIUM 100 MG/1
100 CAPSULE, LIQUID FILLED ORAL DAILY
Status: DISCONTINUED | OUTPATIENT
Start: 2024-10-31 | End: 2024-11-08 | Stop reason: HOSPADM

## 2024-10-31 RX ORDER — ACETAMINOPHEN 325 MG/1
650 TABLET ORAL EVERY 6 HOURS PRN
Status: DISCONTINUED | OUTPATIENT
Start: 2024-10-31 | End: 2024-11-08 | Stop reason: HOSPADM

## 2024-10-31 RX ORDER — CALCIUM GLUCONATE 98 MG/ML
1 INJECTION, SOLUTION INTRAVENOUS
Status: DISCONTINUED | OUTPATIENT
Start: 2024-10-31 | End: 2024-11-05

## 2024-10-31 RX ADMIN — CLINDAMYCIN PHOSPHATE 900 MG: 900 INJECTION, SOLUTION INTRAVENOUS at 12:10

## 2024-10-31 RX ADMIN — ACETAMINOPHEN 650 MG: 325 TABLET, FILM COATED ORAL at 03:10

## 2024-10-31 RX ADMIN — MAGNESIUM SULFATE HEPTAHYDRATE 2 G/HR: 40 INJECTION, SOLUTION INTRAVENOUS at 07:10

## 2024-10-31 RX ADMIN — CLINDAMYCIN PHOSPHATE 900 MG: 900 INJECTION, SOLUTION INTRAVENOUS at 08:10

## 2024-10-31 RX ADMIN — CLINDAMYCIN PHOSPHATE 900 MG: 900 INJECTION, SOLUTION INTRAVENOUS at 02:10

## 2024-10-31 RX ADMIN — DOCUSATE SODIUM 100 MG: 100 CAPSULE, LIQUID FILLED ORAL at 03:10

## 2024-10-31 RX ADMIN — SODIUM CHLORIDE, POTASSIUM CHLORIDE, SODIUM LACTATE AND CALCIUM CHLORIDE 1000 ML: 600; 310; 30; 20 INJECTION, SOLUTION INTRAVENOUS at 08:10

## 2024-10-31 RX ADMIN — GENTAMICIN SULFATE 119.2 MG: 40 INJECTION, SOLUTION INTRAMUSCULAR; INTRAVENOUS at 06:10

## 2024-10-31 RX ADMIN — MAGNESIUM SULFATE HEPTAHYDRATE 2 G/HR: 40 INJECTION, SOLUTION INTRAVENOUS at 12:10

## 2024-10-31 RX ADMIN — ACETAMINOPHEN 650 MG: 325 TABLET, FILM COATED ORAL at 08:10

## 2024-10-31 RX ADMIN — SODIUM CHLORIDE, POTASSIUM CHLORIDE, SODIUM LACTATE AND CALCIUM CHLORIDE 1000 ML: 600; 310; 30; 20 INJECTION, SOLUTION INTRAVENOUS at 02:10

## 2024-10-31 NOTE — PROGRESS NOTES
[facility] - Labor and Delivery  Obstetrics  Antepartum Progress Note    Patient Name: Gretchen Ron  MRN: [id number]  Admission Date: 10/30/2024  Hospital Length of Stay: 0 days  Attending Physician: Tong Burgess MD  Primary Care Provider: Kenzie Martinez MD    Subjective:     Principal Problem:29 weeks gestation of pregnancy    HPI:  At approximately 2:00 a.m. this morning the patient reported to the  nurse that she was leaking fluid.  The patient was noted to be having some contractions approximately every 8 minutes.  The patient was not feeling the contractions however on examination the contractions are palpable approximately every 8 minutes.  There was also gross rupture of the membranes the water was clear.  The nurse performed the exam and stated that the patient was 3 cm dilated.  I came out to evaluate patient.  She does have gross rupture of membranes with clear fluid.  I did not re-examined the patient.  A bedside ultrasound was performed that confirmed the vertex presentation.  Because of the patient's cervical dilation I elected to transfer the patient to labor and delivery in anticipation of possible delivery.    Obstetric HPI:  Patient reports Date/time of onset: 10/30/23, Frequency: Every 8 minutes, Duration: 45 seconds, and Intensity: moderate contractions, active fetal movement, present vaginal bleeding , present loss of fluid at 2am 10/31/24     Objective:     Vital Signs (Most Recent):  Temp: 98.3 °F (36.8 °C) (10/31/24 0322)  Pulse: 80 (10/31/24 0408)  Resp: 16 (10/31/24 0400)  BP: 114/70 (10/31/24 0407)  SpO2: 95 % (10/31/24 0000) Vital Signs (24h Range):  Temp:  [98.3 °F (36.8 °C)-100 °F (37.8 °C)] 98.3 °F (36.8 °C)  Pulse:  [66-84] 80  Resp:  [16-18] 16  SpO2:  [94 %-100 %] 95 %  BP: ()/(46-70) 114/70        There is no height or weight on file to calculate BMI.    FHT: 143Cat 0 (reassuring)  TOCO:  Q 8 minutes      Intake/Output Summary (Last 24 hours)  at 10/31/2024 0554  Last data filed at 10/31/2024 0040  Gross per 24 hour   Intake --   Output 3525 ml   Net -3525 ml       Physical Exam:   Constitutional: She appears well-developed and well-nourished.    HENT:   Head: Atraumatic.    Eyes: Pupils are equal, round, and reactive to light.     Cardiovascular:  Intact distal pulses.             Pulmonary/Chest: Breath sounds normal.        Abdominal: Bowel sounds are normal.     Genitourinary:    Vagina normal.             Musculoskeletal: Normal range of motion.       Neurological: She is alert.    Skin: Skin is warm and dry.    Psychiatric: She has a normal mood and affect.       Cervical Exam:  Dilation:  3  Effacement:  75%  Station: -2  Presentation: Vertex     Significant Labs:  Recent Lab Results         10/31/24  0203   10/30/24  1307        Rupture of Membrane Positive         Baso #   0.03       Basophil %   0.2       Eos #   0.02       Eos %   0.1       Group & Rh   A POS       Hematocrit   32.6       Hemoglobin   10.8       Immature Grans (Abs)   0.27       Immature Granulocytes   2.0       Indirect Gema GEL   NEG       Lymph #   1.68       LYMPH %   12.6       MCH   29.4       MCHC   33.1       MCV   88.8       Mono #   0.80       Mono %   6.0       MPV   10.8       Neut #   10.54       Neut %   79.1       nRBC   0.0       Platelet Count   199        Acceptable Yes         RBC   3.67       RDW   12.1       Specimen Outdate   2024 23:59       Syphilis Antibody   Nonreactive       WBC   13.34               Assessment/Plan:     34 y.o. female  at 29w5d for:    Active Diagnoses:    Diagnosis Date Noted POA    PRINCIPAL PROBLEM:  29 weeks gestation of pregnancy [Z3A.29] 10/28/2024 Not Applicable     premature rupture of membranes [O42.919] 10/31/2024 No    Short cervix affecting pregnancy [O26.879] 10/30/2024 Yes     labor [O60.00] 10/27/2024 Yes    Positive fetal fibronectin at 22 weeks to 34 weeks gestation  [O09.899, R87.89] 10/27/2024 Not Applicable      Problems Resolved During this Admission:       Reinstitute magnesium drip for tocolysis.  Transfer the patient to labor and delivery for closer  Observation of potential labor and delivery.  Initiate latency antibiotics.      Tong Burgess MD  Obstetrics  [facility] - Labor and Delivery

## 2024-10-31 NOTE — CONSULTS
Critical Care  Services  [facility]    Neonatology Consultation Report        REASON FOR CONSULTATION: 28 5/7 weeks gestation, PPROM    REFERRING PHYSICIAN: JASON Burgess MD    HISTORY:   Patient is 34 year old  female at 28 5/7 weeks gestation  that was admitted for premature contractions with short cervix on 10/30 and was confirmed PPROM early this AM. She is currently on latency antibiotics, magnesium for neuroprotection, and has received two doses of steroids. She is currently 3 cm dilated and feeling intermittent contractions. She is carrying a male fetus with an estimated fetal weight of 1363 grams(43rd %ile).      ASSESSMENT:  This is a 35 yo  mother with PPROM at 28 5/6 weeks. I discussed with mother the typical course at 29-30 weeks including respiratory, nutritional, and cardiorespiratory instability. I explained the likely need for mechanical ventilation and umbilical line placement at this gestational age. I encouraged mother to proved expressed breast milk for her  infant. I discussed current survival and developmental statistics for gestation. I stated that her babies outcome will be unpredictable at this time. We will do  screening head ultrasounds and eye exams per unit guidelines. She stated understanding of unpredictable course. I also stated that due to leakage of fluid we will have to monitor baby closely for lung maturity and infection after admission.       RECOMMENDATIONS:  Please continue to keep us updated on mothers hospital course. Please feel free to call for further questions. Thank you for allowing us to participate in the care of this family.       Time: 80 minutes total time of consult including review of maternal chart, face time with mother, discussions with consultants if needed, and preparing of consult note

## 2024-10-31 NOTE — NURSING
Called YOBANI Wells, House Supervisor, to request long term IV placement.  States he will find someone to come and place long term IV when able.

## 2024-10-31 NOTE — NURSING
0155- Maryann ELIZABETH castro notified of pt veda q6 min. Pt reporting no pain but feels tightness. Discussing procardia at this time but blood pressures reported 90's/ 50's due to magnesium. BIMAL Matthew ordered one dose of terbutaline at this time.     0202- Pt called out that she thinks water bag broke. ROM + positive.     0223- Lanier ELIZABETH Castro called to update on membrane status. Ordered to hold terbutaline dose at this time and to do a cervical exam    0230- SVE 3/60/-4     0233- Notified maryann of SVE. Ordered to collect rapid GBS,start PPROM order set, and re start magnesium at 2g/hr.     0235- Dr. Burgess notified of POC and status change. MD wants pt moved to labor and delivery at this time and will come assess pt. Discussed with MD pt allergies and how the PPROM antibiotics are not appropriate due to allergies. MD ordered clindamycin for pt at this time.

## 2024-10-31 NOTE — PROGRESS NOTES
Progress Note  Maternal Fetal Medicine          Subjective:         Gretchen Ron is a 34 y.o.  female with olson IUP at 29w5d who is admitted for short cervix, contractions, now s/p PPROM on 10/31.     Unfortunately, overnight the patient noted leaking and PPROM was evident. She was having some discomfort and ctx and cervical exam showed she was 3cm dilated. She was moved to L&D and magnesium sulfate was restarted for neuroprotection given concern for active labor. She is currently on 2g/hr.        Objective:         Temp:  [97.9 °F (36.6 °C)-100 °F (37.8 °C)] 98.4 °F (36.9 °C)  Pulse:  [66-84] 71  Resp:  [16-18] 16  SpO2:  [94 %-100 %] 97 %  BP: ()/(46-74) 108/60    Gen: NAD, A&Ox3  Pulm: Unlabored breathing, LCTAB  Card: RRR  Abd: FHT present, soft, nondistended, nontender to palpation, gravid uterus palpable c/w gestational age  Extremities: Palpable peripheral pulses, no pedal edema, 2+ DTRs x 4    NST: 135 baseline, moderate BTBV, pos accelerations, some variable decels  [place]: 4 in past hour  Limited OB ultrasound on 10/27/24: vertex presentation, anterior placenta, ALANNA 16cm, TVU-CL 3cm  10/31- Vtx. ALANNA 4.6cm, EFW 1363g (43%), oligo noted (PPROM)    Lab Review  Blood Type: A POS      Recent Results (from the past 24 hours)   SYPHILIS ANTIBODY (WITH REFLEX RPR)    Collection Time: 10/30/24  1:07 PM   Result Value Ref Range    Syphilis Antibody Nonreactive Nonreactive, Equivocal   Type & Screen    Collection Time: 10/30/24  1:07 PM   Result Value Ref Range    Group & Rh A POS     Indirect Gema GEL NEG     Specimen Outdate 2024 23:59    CBC with Differential    Collection Time: 10/30/24  1:07 PM   Result Value Ref Range    WBC 13.34 (H) 4.50 - 11.50 x10(3)/mcL    RBC 3.67 (L) 4.20 - 5.40 x10(6)/mcL    Hgb 10.8 (L) 12.0 - 16.0 g/dL    Hct 32.6 (L) 37.0 - 47.0 %    MCV 88.8 80.0 - 94.0 fL    MCH 29.4 27.0 - 31.0 pg    MCHC 33.1 33.0 - 36.0 g/dL    RDW 12.1 11.5 - 17.0 %    Platelet 199 130 -  400 x10(3)/mcL    MPV 10.8 (H) 7.4 - 10.4 fL    Neut % 79.1 %    Lymph % 12.6 %    Mono % 6.0 %    Eos % 0.1 %    Basophil % 0.2 %    Lymph # 1.68 0.6 - 4.6 x10(3)/mcL    Neut # 10.54 (H) 2.1 - 9.2 x10(3)/mcL    Mono # 0.80 0.1 - 1.3 x10(3)/mcL    Eos # 0.02 0 - 0.9 x10(3)/mcL    Baso # 0.03 <=0.2 x10(3)/mcL    IG# 0.27 (H) 0 - 0.04 x10(3)/mcL    IG% 2.0 %    NRBC% 0.0 %   POCT Rupture of membrane    Collection Time: 10/31/24  2:03 AM   Result Value Ref Range    Rupture of Membrane Positive (A) Negative     Acceptable Yes    Strep Group B by PCR    Collection Time: 10/31/24  3:35 AM   Result Value Ref Range    STREP B PCR ([facility]) Detected (A) GBS Presumptive Not Detected    STREP B CULTURE         Assessment:       34 y.o.  at 29w5d weeks gestation admitted for  contractions, now PPROM 10/31.     Active Hospital Problems    Diagnosis  POA    *29 weeks gestation of pregnancy [Z3A.29]  Not Applicable     premature rupture of membranes [O42.919]  No    Short cervix affecting pregnancy [O26.879]  Yes     labor [O60.00]  Yes    Positive fetal fibronectin at 22 weeks to 34 weeks gestation [O09.899, R87.89]  Not Applicable      Resolved Hospital Problems   No resolved problems to display.        Plan:     1. PPROM  She was admitted for  ctx and unfortunately had PPROM on 10/31.   Patient is admitted with  premature rupture of membranes. The patient was counseled regarding the need for inpatient management including maternal and fetal monitoring for signs and symptoms of chorioamnionitis, abruption, labor, and fetal wellbeing. We reviewed prematurity complications and discussed delivery timing recommendations.    As she is noted to have cervical dilation, latency may be less likely. We will monitor closely.     Recommendations:  S/p steroids within 1 week (29 weeks)  Currently finishing magnesium sulfate for neuroprotection.   This is administered with an initial  loading dose of 6 grams of magnesium sulfate, followed by an infusion rate of 2 grams/hour for 12 hours. This can be discontinued if delivery is not imminent.   Magnesium sulfate should be re-initiated (with a 6 gram loading dose if it has been > 6 hours since discontinuing a prior course) if delivery is thought to be imminent prior to 32 weeks gestation.  Avoid tocolysis (nifedipine and indocin)  GBS culture (POS)/ latency antibiotics- she has multiple allergies to antibiotics (all childhood rxns and she is unsure). She was started on clindamycin due to concern for cephalosporin allergy.   As she has limited options. I recommend adding 24 hrs gentamicin with 1.5mg/kg x 3 doses q8.   Obtain estimated fetal weight, assess fetal presentation with ultrasound  Reassess fetal growth every 3 weeks (completed today)  NICU consultation  Lactation consultation  Delivery at 34 weeks or earlier, if clinically indicated  Continuous EFM/[place] at this time.     2. FWB  - S/p steroids within 1 week  - NICU consult made today  - CEFM for now. Will consider deescalating.     Thank you for allowing us to participate in the care of your patient. If you have any questions/concerns, please do not hesitate to contact us.       Jerri Tate MD  Maternal Fetal Medicine

## 2024-11-01 PROCEDURE — 36569 INSJ PICC 5 YR+ W/O IMAGING: CPT

## 2024-11-01 PROCEDURE — 25000003 PHARM REV CODE 250: Performed by: NURSE PRACTITIONER

## 2024-11-01 PROCEDURE — 27000492 HC SLEEVE, SCD T/L

## 2024-11-01 PROCEDURE — 63600175 PHARM REV CODE 636 W HCPCS: Mod: JZ,JG | Performed by: NURSE PRACTITIONER

## 2024-11-01 PROCEDURE — 25000003 PHARM REV CODE 250: Performed by: OBSTETRICS & GYNECOLOGY

## 2024-11-01 PROCEDURE — 63600175 PHARM REV CODE 636 W HCPCS: Performed by: NURSE PRACTITIONER

## 2024-11-01 PROCEDURE — 63600175 PHARM REV CODE 636 W HCPCS: Performed by: OBSTETRICS & GYNECOLOGY

## 2024-11-01 PROCEDURE — C1751 CATH, INF, PER/CENT/MIDLINE: HCPCS

## 2024-11-01 PROCEDURE — 99233 SBSQ HOSP IP/OBS HIGH 50: CPT | Mod: ,,, | Performed by: OBSTETRICS & GYNECOLOGY

## 2024-11-01 PROCEDURE — 11000001 HC ACUTE MED/SURG PRIVATE ROOM

## 2024-11-01 RX ORDER — SODIUM CHLORIDE 0.9 % (FLUSH) 0.9 %
10 SYRINGE (ML) INJECTION EVERY 12 HOURS PRN
Status: DISCONTINUED | OUTPATIENT
Start: 2024-11-01 | End: 2024-11-08 | Stop reason: HOSPADM

## 2024-11-01 RX ORDER — CLINDAMYCIN HYDROCHLORIDE 150 MG/1
300 CAPSULE ORAL 3 TIMES DAILY
Status: DISCONTINUED | OUTPATIENT
Start: 2024-11-02 | End: 2024-11-06

## 2024-11-01 RX ADMIN — SODIUM CHLORIDE, POTASSIUM CHLORIDE, SODIUM LACTATE AND CALCIUM CHLORIDE 75 ML: 600; 310; 30; 20 INJECTION, SOLUTION INTRAVENOUS at 11:11

## 2024-11-01 RX ADMIN — DOCUSATE SODIUM 100 MG: 100 CAPSULE, LIQUID FILLED ORAL at 09:11

## 2024-11-01 RX ADMIN — GENTAMICIN SULFATE 119.2 MG: 40 INJECTION, SOLUTION INTRAMUSCULAR; INTRAVENOUS at 11:11

## 2024-11-01 RX ADMIN — CLINDAMYCIN PHOSPHATE 900 MG: 900 INJECTION, SOLUTION INTRAVENOUS at 03:11

## 2024-11-01 RX ADMIN — CLINDAMYCIN PHOSPHATE 900 MG: 900 INJECTION, SOLUTION INTRAVENOUS at 10:11

## 2024-11-01 RX ADMIN — CLINDAMYCIN PHOSPHATE 900 MG: 900 INJECTION, SOLUTION INTRAVENOUS at 04:11

## 2024-11-01 RX ADMIN — GENTAMICIN SULFATE 119.2 MG: 40 INJECTION, SOLUTION INTRAMUSCULAR; INTRAVENOUS at 03:11

## 2024-11-01 NOTE — PROGRESS NOTES
[facility] - Labor and Delivery  Obstetrics  Antepartum Progress Note    Patient Name: Gretchen Ron  MRN: [id number]  Admission Date: 10/30/2024  Hospital Length of Stay: 0 days  Attending Physician: Tong Burgess MD  Primary Care Provider: Kenzie Martinez MD    Subjective:     Principal Problem:29 weeks gestation of pregnancy    HPI:  The patient rested throughout night without contractions.  Fetal heart tracing has been reassuring.  The patient's vitals have been stable with no temp her blood pressures have been stable.    Obstetric HPI:  Patient reports None contractions, active fetal movement, absent vaginal bleeding , present loss of fluid      Objective:     Vital Signs (Most Recent):  Temp: 98.3 °F (36.8 °C) (11/01/24 0758)  Pulse: 80 (11/01/24 0758)  Resp: 13 (11/01/24 0758)  BP: 116/77 (11/01/24 0758)  SpO2: 99 % (11/01/24 0753) Vital Signs (24h Range):  Temp:  [96.9 °F (36.1 °C)-98.6 °F (37 °C)] 98.3 °F (36.8 °C)  Pulse:  [61-87] 80  Resp:  [13-18] 13  SpO2:  [93 %-100 %] 99 %  BP: (102-120)/(56-77) 116/77     Weight: 79.4 kg (175 lb)  Body mass index is 29.12 kg/m².    FHT: 143Cat 0 (reassuring)  TOCO:  Q 10 minutes      Intake/Output Summary (Last 24 hours) at 11/1/2024 0835  Last data filed at 10/31/2024 1744  Gross per 24 hour   Intake 1126.66 ml   Output 2750 ml   Net -1623.34 ml       Physical Exam:   Constitutional: She appears well-developed and well-nourished.    HENT:   Head: Atraumatic.    Eyes: Pupils are equal, round, and reactive to light.     Cardiovascular:  Intact distal pulses.             Pulmonary/Chest: Breath sounds normal.        Abdominal: Bowel sounds are normal.     Genitourinary:    Vagina normal.             Musculoskeletal: Normal range of motion.       Neurological: She is alert.    Skin: Skin is warm and dry.    Psychiatric: She has a normal mood and affect.       Cervical Exam:  Dilation:  1  Effacement:  50%  Station: -2  Presentation: Vertex      Significant Labs:  Recent Lab Results       None            Assessment/Plan:     34 y.o. female  at 29w6d for:    Active Diagnoses:    Diagnosis Date Noted POA    PRINCIPAL PROBLEM:  29 weeks gestation of pregnancy [Z3A.29] 10/28/2024 Not Applicable     premature rupture of membranes [O42.919] 10/31/2024 No    Short cervix affecting pregnancy [O26.879] 10/30/2024 Yes     labor [O60.00] 10/27/2024 Yes    Positive fetal fibronectin at 22 weeks to 34 weeks gestation [O09.899, R87.89] 10/27/2024 Not Applicable      Problems Resolved During this Admission:       Twenty-nine weeks and 6 days with  premature rupture of membranes.  The patient has had no contractions throughout the night.  The fetal heart tracing is reassuring.  My recommendation is to transfer the patient back to the  unit with continued hospitalization for P prom.  We will perform nonstress testing twice a day and placed on continuous monitoring if the patient begins to experience contractions      Tong Burgess MD  Obstetrics  [facility] - Labor and Delivery

## 2024-11-01 NOTE — PLAN OF CARE
Problem: Adult Inpatient Plan of Care  Goal: Plan of Care Review  Outcome: Progressing  Goal: Patient-Specific Goal (Individualized)  Outcome: Progressing  Goal: Absence of Hospital-Acquired Illness or Injury  Outcome: Progressing  Goal: Optimal Comfort and Wellbeing  Outcome: Progressing  Goal: Readiness for Transition of Care  Outcome: Progressing     Problem:  Fall Injury Risk  Goal: Absence of Fall, Infant Drop and Related Injury  Outcome: Progressing     Problem: Infection  Goal: Absence of Infection Signs and Symptoms  Outcome: Progressing     Problem:  Labor  Goal: Delayed  Delivery  Outcome: Progressing

## 2024-11-01 NOTE — PROGRESS NOTES
Progress Note  Maternal Fetal Medicine          Subjective:         Gretchen Ron is a 34 y.o.  female with olson IUP at 29w6d who is admitted for short cervix, contractions, now s/p PPROM on 10/31.     She is feeling well and has no current complaints. She is having rare ctx and had  a quiet night. She denies bleeding and has some intermittent leaking. No fevers or tenderness. She has some mucus discharge.     Stable for transfer back to antepartum unit. Checked by Dr. Burgess yesterday and noted to be 1cm.     Objective:         Temp:  [96.9 °F (36.1 °C)-98.6 °F (37 °C)] 98.3 °F (36.8 °C)  Pulse:  [61-87] 80  Resp:  [13-18] 13  SpO2:  [93 %-100 %] 99 %  BP: (102-120)/(56-77) 116/77    Gen: NAD, A&Ox3  Pulm: Unlabored breathing, LCTAB  Card: RRR  Abd: FHT present, soft, nondistended, nontender to palpation, gravid uterus palpable c/w gestational age  Extremities: Palpable peripheral pulses, no pedal edema, 2+ DTRs x 4    NST: 135 baseline, moderate BTBV, pos accelerations, no decels  [place]: quiet  Limited OB ultrasound on 10/27/24: vertex presentation, anterior placenta, ALANNA 16cm, TVU-CL 3cm  10/31- Vtx. ALANNA 4.6cm, EFW 1363g (43%), oligo noted (PPROM)    Lab Review  Blood Type: A POS      No results found for this or any previous visit (from the past 24 hours).      Assessment:       34 y.o.  at 29w6d weeks gestation admitted for  contractions, now PPROM 10/31.     Active Hospital Problems    Diagnosis  POA    *29 weeks gestation of pregnancy [Z3A.29]  Not Applicable     premature rupture of membranes [O42.919]  No    Short cervix affecting pregnancy [O26.879]  Yes     labor [O60.00]  Yes    Positive fetal fibronectin at 22 weeks to 34 weeks gestation [O09.899, R87.89]  Not Applicable      Resolved Hospital Problems   No resolved problems to display.        Plan:     1. PPROM  She was admitted for  ctx and unfortunately had PPROM on 10/31.   Patient is admitted with   premature rupture of membranes. The patient was counseled regarding the need for inpatient management including maternal and fetal monitoring for signs and symptoms of chorioamnionitis, abruption, labor, and fetal wellbeing. We reviewed prematurity complications and discussed delivery timing recommendations.    Recheck by Dr. Burgess showed 1cm dilated with head not applied. We will continue to monitor.     Recommendations:  S/p steroids within 1 week (29 weeks)  S/p magnesium sulfate for neuroprotection  Magnesium sulfate should be re-initiated (with a 6 gram loading dose if it has been > 6 hours since discontinuing a prior course) if delivery is thought to be imminent prior to 32 weeks gestation.  Avoid tocolysis (nifedipine and indocin)  GBS culture (POS)/ latency antibiotics- she has multiple allergies to antibiotics (all childhood rxns and she is unsure). She was started on clindamycin due to concern for cephalosporin allergy. - we will switch to PO dosing after 48 hrs.  As she has limited options. I recommend adding 24 hrs gentamicin with 1.5mg/kg x 3 doses q8.   Obtain estimated fetal weight, assess fetal presentation with ultrasound  Reassess fetal growth every 3 weeks (completed 10/31)  NICU consultation complete  Lactation consultation  Delivery at 34 weeks or earlier, if clinically indicated  NST TID x 1 hr    2. FWB  - S/p steroids within 1 week  - NICU consult made today  - CEFM for now. Will consider deescalating.     Thank you for allowing us to participate in the care of your patient. If you have any questions/concerns, please do not hesitate to contact us.       Jerri Tate MD  Maternal Fetal Medicine

## 2024-11-01 NOTE — PROCEDURES
Gretchen Ron is a 34 y.o. female patient.    Temp: 98.7 °F (37.1 °C) (11/01/24 1224)  Pulse: 83 (11/01/24 1325)  Resp: 13 (11/01/24 0758)  BP: 110/65 (11/01/24 1225)  SpO2: 100 % (11/01/24 1325)  Weight: 79.4 kg (175 lb) (10/31/24 1858)    PICC  Date/Time: 11/1/2024 1:47 PM  Performed by: Waldemar Lopez, RN  Consent Done: Yes  Time out: Immediately prior to procedure a time out was called to verify the correct patient, procedure, equipment, support staff and site/side marked as required  Indications: med administration and vascular access  Anesthesia: local infiltration  Local anesthetic: lidocaine 1% without epinephrine  Anesthetic Total (mL): 3  Preparation: skin prepped with ChloraPrep  Skin prep agent dried: skin prep agent completely dried prior to procedure  Sterile barriers: all five maximum sterile barriers used - cap, mask, sterile gown, sterile gloves, and large sterile sheet  Hand hygiene: hand hygiene performed prior to central venous catheter insertion  Location details: left brachial  Catheter type: double lumen  Catheter size: 5 Fr  Catheter Length: 37cm    Ultrasound guidance: yes  Vessel Caliber: patent, compressibility normal  Vascular Doppler: not done  Needle advanced into vessel with real time Ultrasound guidance.  Guidewire confirmed in vessel.  Sterile sheath used.  no esophageal manometryNumber of attempts: 1  Post-procedure: sterile dressing applied, blood return through all ports and chlorhexidine patch    Assessment: placement verified by x-ray  Complications: none          Waldemar Lopez RN  11/1/2024

## 2024-11-02 LAB
BACTERIA SPEC CULT: NORMAL
BASOPHILS # BLD AUTO: 0.03 X10(3)/MCL
BASOPHILS NFR BLD AUTO: 0.2 %
EOSINOPHIL # BLD AUTO: 0.07 X10(3)/MCL (ref 0–0.9)
EOSINOPHIL NFR BLD AUTO: 0.5 %
ERYTHROCYTE [DISTWIDTH] IN BLOOD BY AUTOMATED COUNT: 11.9 % (ref 11.5–17)
HCT VFR BLD AUTO: 34.4 % (ref 37–47)
HGB BLD-MCNC: 11.4 G/DL (ref 12–16)
IMM GRANULOCYTES # BLD AUTO: 0.5 X10(3)/MCL (ref 0–0.04)
IMM GRANULOCYTES NFR BLD AUTO: 3.6 %
LYMPHOCYTES # BLD AUTO: 1.66 X10(3)/MCL (ref 0.6–4.6)
LYMPHOCYTES NFR BLD AUTO: 11.9 %
MCH RBC QN AUTO: 29.5 PG (ref 27–31)
MCHC RBC AUTO-ENTMCNC: 33.1 G/DL (ref 33–36)
MCV RBC AUTO: 89.1 FL (ref 80–94)
MONOCYTES # BLD AUTO: 0.56 X10(3)/MCL (ref 0.1–1.3)
MONOCYTES NFR BLD AUTO: 4 %
NEUTROPHILS # BLD AUTO: 11.16 X10(3)/MCL (ref 2.1–9.2)
NEUTROPHILS NFR BLD AUTO: 79.8 %
NRBC BLD AUTO-RTO: 0 %
PLATELET # BLD AUTO: 243 X10(3)/MCL (ref 130–400)
PMV BLD AUTO: 9.9 FL (ref 7.4–10.4)
RBC # BLD AUTO: 3.86 X10(6)/MCL (ref 4.2–5.4)
STREP B CULTURE (OHS): NEGATIVE
STREP B PCR (OHS): DETECTED
WBC # BLD AUTO: 13.98 X10(3)/MCL (ref 4.5–11.5)

## 2024-11-02 PROCEDURE — 99233 SBSQ HOSP IP/OBS HIGH 50: CPT | Mod: ,,, | Performed by: OBSTETRICS & GYNECOLOGY

## 2024-11-02 PROCEDURE — 11000001 HC ACUTE MED/SURG PRIVATE ROOM

## 2024-11-02 PROCEDURE — 93005 ELECTROCARDIOGRAM TRACING: CPT

## 2024-11-02 PROCEDURE — 25000003 PHARM REV CODE 250: Performed by: OBSTETRICS & GYNECOLOGY

## 2024-11-02 PROCEDURE — 93010 ELECTROCARDIOGRAM REPORT: CPT | Mod: ,,, | Performed by: INTERNAL MEDICINE

## 2024-11-02 PROCEDURE — C1751 CATH, INF, PER/CENT/MIDLINE: HCPCS

## 2024-11-02 PROCEDURE — 85025 COMPLETE CBC W/AUTO DIFF WBC: CPT | Performed by: OBSTETRICS & GYNECOLOGY

## 2024-11-02 PROCEDURE — A4216 STERILE WATER/SALINE, 10 ML: HCPCS | Performed by: OBSTETRICS & GYNECOLOGY

## 2024-11-02 RX ADMIN — SODIUM CHLORIDE, PRESERVATIVE FREE 10 ML: 5 INJECTION INTRAVENOUS at 09:11

## 2024-11-02 RX ADMIN — DOCUSATE SODIUM 100 MG: 100 CAPSULE, LIQUID FILLED ORAL at 08:11

## 2024-11-02 RX ADMIN — CLINDAMYCIN HYDROCHLORIDE 300 MG: 150 CAPSULE ORAL at 08:11

## 2024-11-02 RX ADMIN — CLINDAMYCIN HYDROCHLORIDE 300 MG: 150 CAPSULE ORAL at 03:11

## 2024-11-02 RX ADMIN — CLINDAMYCIN HYDROCHLORIDE 300 MG: 150 CAPSULE ORAL at 09:11

## 2024-11-02 NOTE — PROGRESS NOTES
Progress Note  Maternal Fetal Medicine          Subjective:         Gretchen Ron is a 34 y.o.  female with olson IUP at 30w0d who is admitted for short cervix, contractions, now s/p PPROM on 10/31.     She is feeling well and has no current complaints. She is having rare ctx. She denies bleeding and has some intermittent leaking. No fevers or tenderness. She has some mucus discharge.   PICC line in place. OK to use for meds/ draw blood.         Objective:         Temp:  [96.8 °F (36 °C)-98.7 °F (37.1 °C)] 96.8 °F (36 °C)  Pulse:  [62-90] 62  Resp:  [14-15] 14  SpO2:  [94 %-100 %] 97 %  BP: (102-117)/(53-67) 102/53    Gen: NAD, A&Ox3  Pulm: Unlabored breathing, LCTAB  Card: RRR  Abd: FHT present, soft, nondistended, nontender to palpation, gravid uterus palpable c/w gestational age  Extremities: Palpable peripheral pulses, no pedal edema, 2+ DTRs x 4    NST: 135 baseline, moderate BTBV, pos accelerations, no decels  [place]: quiet  Limited OB ultrasound on 10/27/24: vertex presentation, anterior placenta, ALANNA 16cm, TVU-CL 3cm  10/31- Vtx. ALANNA 4.6cm, EFW 1363g (43%), oligo noted (PPROM)  - ALANNA 2.4cm. Vtx.    Lab Review  Blood Type: A POS      No results found for this or any previous visit (from the past 24 hours).      Assessment:       34 y.o.  at 30w0d weeks gestation admitted for  contractions, now PPROM 10/31.     Active Hospital Problems    Diagnosis  POA    *29 weeks gestation of pregnancy [Z3A.29]  Not Applicable     premature rupture of membranes [O42.919]  No    Short cervix affecting pregnancy [O26.879]  Yes     labor [O60.00]  Yes    Positive fetal fibronectin at 22 weeks to 34 weeks gestation [O09.899, R87.89]  Not Applicable      Resolved Hospital Problems   No resolved problems to display.        Plan:     1. PPROM  She was admitted for  ctx and unfortunately had PPROM on 10/31.   Patient is admitted with  premature rupture of membranes. The patient  was counseled regarding the need for inpatient management including maternal and fetal monitoring for signs and symptoms of chorioamnionitis, abruption, labor, and fetal wellbeing. We reviewed prematurity complications and discussed delivery timing recommendations.    Recheck by Dr. Burgess showed 1cm dilated with head not applied. We will continue to monitor.     Recommendations:  S/p steroids within 1 week (29 weeks)  S/p magnesium sulfate for neuroprotection  Magnesium sulfate should be re-initiated (with a 6 gram loading dose if it has been > 6 hours since discontinuing a prior course) if delivery is thought to be imminent prior to 32 weeks gestation.  Avoid tocolysis (nifedipine and indocin)  GBS culture (POS)/ latency antibiotics- she has multiple allergies to antibiotics (all childhood rxns and she is unsure). She was started on clindamycin due to concern for cephalosporin allergy. - Currently on PO clindamycin x 5 days.  As she has limited options. I recommend adding 24 hrs gentamicin with 1.5mg/kg x 3 doses q8. (Completed)  Obtain estimated fetal weight, assess fetal presentation with ultrasound  Reassess fetal growth every 3 weeks (completed 10/31)  NICU consultation complete  Lactation consultation  Delivery at 34 weeks or earlier, if clinically indicated  NST TID x 1 hr    2. FWB  - S/p steroids within 1 week  - NICU consult complete  - NST TID x 1 hr    Thank you for allowing us to participate in the care of your patient. If you have any questions/concerns, please do not hesitate to contact us.       Jerri Tate MD  Maternal Fetal Medicine

## 2024-11-02 NOTE — PROGRESS NOTES
[facility] - Antepartum  Obstetrics  Antepartum Progress Note    Patient Name: Gretchen Ron  MRN: [id number]  Admission Date: 10/30/2024  Hospital Length of Stay: 1 days  Attending Physician: Tong Burgess MD  Primary Care Provider: Kenzie Martinez MD    Subjective:     Principal Problem:29 weeks gestation of pregnancy    HPI: no complaints this morning. Having random contractions that are mild. Good FM,Random  variable decels.     Obstetric HPI:  Patient reports random mild contractions, active fetal movement, absent vaginal bleeding , present loss of fluid      Objective:     Vital Signs (Most Recent):  Temp: 96.8 °F (36 °C) (11/02/24 0856)  Pulse: 62 (11/02/24 0335)  Resp: 14 (11/02/24 0335)  BP: (!) 102/53 (11/02/24 0335)  SpO2: 97 % (11/01/24 2313) Vital Signs (24h Range):  Temp:  [96.8 °F (36 °C)-98.7 °F (37.1 °C)] 96.8 °F (36 °C)  Pulse:  [62-90] 62  Resp:  [14-15] 14  SpO2:  [94 %-100 %] 97 %  BP: (102-117)/(53-67) 102/53     Weight: 79.4 kg (175 lb)  Body mass index is 29.12 kg/m².    FHT: 145 Cat 0 (reassuring)  TOCO:  random contractions      Intake/Output Summary (Last 24 hours) at 11/2/2024 1010  Last data filed at 11/1/2024 1450  Gross per 24 hour   Intake 42.05 ml   Output --   Net 42.05 ml       Physical Exam:   Constitutional: She appears well-developed and well-nourished.    HENT:   Head: Atraumatic.    Eyes: Pupils are equal, round, and reactive to light.     Cardiovascular:  Intact distal pulses.             Pulmonary/Chest: Breath sounds normal.        Abdominal: Bowel sounds are normal.     Genitourinary:    Vagina normal.             Musculoskeletal: Normal range of motion.       Neurological: She is alert.    Skin: Skin is warm and dry.    Psychiatric: She has a normal mood and affect.       Cervical Exam:  Dilation:  1  Effacement:  25%  Station: -3  Presentation: Vertex     Significant Labs:BPP 8/8 ALANNA 2.4cm  Recent Lab Results       None            Assessment/Plan:      34 y.o. female  at 30w0d for:    Active Diagnoses:    Diagnosis Date Noted POA    PRINCIPAL PROBLEM:  29 weeks gestation of pregnancy [Z3A.29] 10/28/2024 Not Applicable     premature rupture of membranes [O42.919] 10/31/2024 No    Short cervix affecting pregnancy [O26.879] 10/30/2024 Yes     labor [O60.00] 10/27/2024 Yes    Positive fetal fibronectin at 22 weeks to 34 weeks gestation [O09.899, R87.89] 10/27/2024 Not Applicable      Problems Resolved During this Admission:       30 weeks PPROM  Continue close observation. /      Tong Burgess MD  Obstetrics  [facility] - Antepartum

## 2024-11-02 NOTE — PROGRESS NOTES
Pt w/ LUE picc placed yesterday by myself w/ placement confirmed to be in distal svc, c/o palpitations when laying on L side. Repeat cxr reveals unchanged placement. EGG shows no tachyarrhythmia. Retracted 1cm in sterile fashion and redressed in event of PICC causing palpitations when pt laying on L side.

## 2024-11-03 LAB
OHS QRS DURATION: 132 MS
OHS QTC CALCULATION: 455 MS

## 2024-11-03 PROCEDURE — C1751 CATH, INF, PER/CENT/MIDLINE: HCPCS

## 2024-11-03 PROCEDURE — 25000003 PHARM REV CODE 250: Performed by: OBSTETRICS & GYNECOLOGY

## 2024-11-03 PROCEDURE — 11000001 HC ACUTE MED/SURG PRIVATE ROOM

## 2024-11-03 PROCEDURE — 99233 SBSQ HOSP IP/OBS HIGH 50: CPT | Mod: ,,, | Performed by: OBSTETRICS & GYNECOLOGY

## 2024-11-03 RX ADMIN — CLINDAMYCIN HYDROCHLORIDE 300 MG: 150 CAPSULE ORAL at 08:11

## 2024-11-03 RX ADMIN — DOCUSATE SODIUM 100 MG: 100 CAPSULE, LIQUID FILLED ORAL at 08:11

## 2024-11-03 RX ADMIN — CLINDAMYCIN HYDROCHLORIDE 300 MG: 150 CAPSULE ORAL at 03:11

## 2024-11-03 NOTE — PROGRESS NOTES
[facility] - Antepartum  Obstetrics  Antepartum Progress Note    Patient Name: Gretchen Ron  MRN: [id number]  Admission Date: 10/30/2024  Hospital Length of Stay: 2 days  Attending Physician: Tong Burgess MD  Primary Care Provider: Kenzie Martinez MD    Subjective:     Principal Problem:29 weeks gestation of pregnancy    HPI:  30 weeks and 1 day gestation with P prom.  A PICC line was placed for continuous IV access.  Yesterday she complained of tachycardia when laying on her right side.  Chest x-ray confirmed proper placement an EKG was performed walk.  The PICC line was repositioned by the PICC team and retracted 1 cm.  The patient has no complaints this morning.  She is afebrile T does complain of a slight amount of leakage.    Obstetric HPI:  Patient reports None contractions, active fetal movement, absent vaginal bleeding , present loss of fluid      Objective:     Vital Signs (Most Recent):  Temp: 98 °F (36.7 °C) (11/03/24 0606)  Pulse: 77 (11/03/24 0606)  Resp: 16 (11/03/24 0606)  BP: (!) 104/58 (11/03/24 0606)  SpO2: 97 % (11/03/24 0606) Vital Signs (24h Range):  Temp:  [96.8 °F (36 °C)-98.4 °F (36.9 °C)] 98 °F (36.7 °C)  Pulse:  [] 77  Resp:  [14-16] 16  SpO2:  [90 %-100 %] 97 %  BP: (103-117)/(58-72) 104/58     Weight: 79.4 kg (175 lb)  Body mass index is 29.12 kg/m².    FHT: 143Cat 0 (reassuring)  TOCO:  Q occaisional contractions    No intake or output data in the 24 hours ending 11/03/24 0635    Physical Exam:   Constitutional: She appears well-developed and well-nourished.    HENT:   Head: Atraumatic.    Eyes: Pupils are equal, round, and reactive to light.     Cardiovascular:  Intact distal pulses.             Pulmonary/Chest: Breath sounds normal.        Abdominal: Bowel sounds are normal.     Genitourinary:    Vagina normal.             Musculoskeletal: Normal range of motion.       Neurological: She is alert.    Skin: Skin is warm and dry.    Psychiatric: She has a  normal mood and affect.       Cervical Exam:  Dilation:  1  Effacement:  75%  Station: -3  Presentation: Vertex     Significant Labs:  Recent Lab Results         24  1812        Baso # 0.03       Basophil % 0.2       Eos # 0.07       Eos % 0.5       Hematocrit 34.4       Hemoglobin 11.4       Immature Grans (Abs) 0.50       Immature Granulocytes 3.6       Lymph # 1.66       LYMPH % 11.9       MCH 29.5       MCHC 33.1       MCV 89.1       Mono # 0.56       Mono % 4.0       MPV 9.9       Neut # 11.16       Neut % 79.8       nRBC 0.0       Platelet Count 243       RBC 3.86       RDW 11.9       WBC 13.98               Assessment/Plan:     34 y.o. female  at 30w1d for:    Active Diagnoses:    Diagnosis Date Noted POA    PRINCIPAL PROBLEM:  29 weeks gestation of pregnancy [Z3A.29] 10/28/2024 Not Applicable     premature rupture of membranes [O42.919] 10/31/2024 No    Short cervix affecting pregnancy [O26.879] 10/30/2024 Yes     labor [O60.00] 10/27/2024 Yes    Positive fetal fibronectin at 22 weeks to 34 weeks gestation [O09.899, R87.89] 10/27/2024 Not Applicable      Problems Resolved During this Admission:       30w1d with PPROM.  Cont close OBS and latency antibiotics      Tong Burgess MD  Obstetrics  [facility] - Antepartum

## 2024-11-04 LAB
BASOPHILS # BLD AUTO: 0.06 X10(3)/MCL
BASOPHILS NFR BLD AUTO: 0.4 %
EOSINOPHIL # BLD AUTO: 0.05 X10(3)/MCL (ref 0–0.9)
EOSINOPHIL NFR BLD AUTO: 0.4 %
ERYTHROCYTE [DISTWIDTH] IN BLOOD BY AUTOMATED COUNT: 11.9 % (ref 11.5–17)
GROUP & RH: NORMAL
HCT VFR BLD AUTO: 33.3 % (ref 37–47)
HGB BLD-MCNC: 11.4 G/DL (ref 12–16)
IMM GRANULOCYTES # BLD AUTO: 0.68 X10(3)/MCL (ref 0–0.04)
IMM GRANULOCYTES NFR BLD AUTO: 4.8 %
INDIRECT COOMBS: NORMAL
LYMPHOCYTES # BLD AUTO: 1.88 X10(3)/MCL (ref 0.6–4.6)
LYMPHOCYTES NFR BLD AUTO: 13.4 %
MCH RBC QN AUTO: 29.2 PG (ref 27–31)
MCHC RBC AUTO-ENTMCNC: 34.2 G/DL (ref 33–36)
MCV RBC AUTO: 85.4 FL (ref 80–94)
MONOCYTES # BLD AUTO: 0.6 X10(3)/MCL (ref 0.1–1.3)
MONOCYTES NFR BLD AUTO: 4.3 %
NEUTROPHILS # BLD AUTO: 10.77 X10(3)/MCL (ref 2.1–9.2)
NEUTROPHILS NFR BLD AUTO: 76.7 %
NRBC BLD AUTO-RTO: 0 %
PLATELET # BLD AUTO: 196 X10(3)/MCL (ref 130–400)
PMV BLD AUTO: 10.2 FL (ref 7.4–10.4)
RBC # BLD AUTO: 3.9 X10(6)/MCL (ref 4.2–5.4)
SPECIMEN OUTDATE: NORMAL
WBC # BLD AUTO: 14.04 X10(3)/MCL (ref 4.5–11.5)

## 2024-11-04 PROCEDURE — 85025 COMPLETE CBC W/AUTO DIFF WBC: CPT | Performed by: OBSTETRICS & GYNECOLOGY

## 2024-11-04 PROCEDURE — 63600175 PHARM REV CODE 636 W HCPCS: Performed by: OBSTETRICS & GYNECOLOGY

## 2024-11-04 PROCEDURE — 11000001 HC ACUTE MED/SURG PRIVATE ROOM

## 2024-11-04 PROCEDURE — 25000003 PHARM REV CODE 250: Performed by: OBSTETRICS & GYNECOLOGY

## 2024-11-04 PROCEDURE — 99233 SBSQ HOSP IP/OBS HIGH 50: CPT | Mod: ,,, | Performed by: OBSTETRICS & GYNECOLOGY

## 2024-11-04 PROCEDURE — 36415 COLL VENOUS BLD VENIPUNCTURE: CPT | Performed by: OBSTETRICS & GYNECOLOGY

## 2024-11-04 PROCEDURE — 86850 RBC ANTIBODY SCREEN: CPT | Performed by: OBSTETRICS & GYNECOLOGY

## 2024-11-04 RX ORDER — BETAMETHASONE SODIUM PHOSPHATE AND BETAMETHASONE ACETATE 3; 3 MG/ML; MG/ML
12 INJECTION, SUSPENSION INTRA-ARTICULAR; INTRALESIONAL; INTRAMUSCULAR; SOFT TISSUE ONCE
Status: COMPLETED | OUTPATIENT
Start: 2024-11-04 | End: 2024-11-04

## 2024-11-04 RX ORDER — BETAMETHASONE SODIUM PHOSPHATE AND BETAMETHASONE ACETATE 3; 3 MG/ML; MG/ML
12 INJECTION, SUSPENSION INTRA-ARTICULAR; INTRALESIONAL; INTRAMUSCULAR; SOFT TISSUE ONCE
Status: COMPLETED | OUTPATIENT
Start: 2024-11-05 | End: 2024-11-05

## 2024-11-04 RX ADMIN — BETAMETHASONE SODIUM PHOSPHATE AND BETAMETHASONE ACETATE 12 MG: 3; 3 INJECTION, SUSPENSION INTRA-ARTICULAR; INTRALESIONAL; INTRAMUSCULAR at 04:11

## 2024-11-04 RX ADMIN — DOCUSATE SODIUM 100 MG: 100 CAPSULE, LIQUID FILLED ORAL at 08:11

## 2024-11-04 RX ADMIN — CLINDAMYCIN HYDROCHLORIDE 300 MG: 150 CAPSULE ORAL at 08:11

## 2024-11-04 RX ADMIN — CLINDAMYCIN HYDROCHLORIDE 300 MG: 150 CAPSULE ORAL at 09:11

## 2024-11-04 RX ADMIN — CLINDAMYCIN HYDROCHLORIDE 300 MG: 150 CAPSULE ORAL at 03:11

## 2024-11-04 NOTE — PROGRESS NOTES
Progress Note  Maternal Fetal Medicine          Subjective:         Gretchen Ron is a 34 y.o.  female with olson IUP at 30w2d who is admitted for short cervix, contractions, now s/p PPROM on 10/31.     Yesterday she had increased ctx frequency and now they are about q15-20 minutes. She says they are 4/10. Not super painful but regular. No bleeding more than some spotting. +LOF. Hydrating as she can.         Objective:         Temp:  [97.9 °F (36.6 °C)-98.4 °F (36.9 °C)] 98.3 °F (36.8 °C)  Pulse:  [75-90] 90  Resp:  [14-18] 18  SpO2:  [96 %-98 %] 96 %  BP: ()/(58-72) 117/65    Gen: NAD, A&Ox3  Pulm: Unlabored breathing, LCTAB  Card: RRR  Abd: FHT present, soft, nondistended, nontender to palpation, gravid uterus palpable c/w gestational age  Extremities: Palpable peripheral pulses, no pedal edema, 2+ DTRs x 4    NST: 145 baseline, moderate BTBV, pos accelerations, no decels  [place]: about q 15 minutes  Limited OB ultrasound on 10/27/24: vertex presentation, anterior placenta, ALANNA 16cm, TVU-CL 3cm  10/31- Vtx. ALANNA 4.6cm, EFW 1363g (43%), oligo noted (PPROM)  - ALANNA 2.4cm. Vtx.    Lab Review  Blood Type: A POS      Recent Results (from the past 24 hours)   Type & Screen    Collection Time: 24  7:19 AM   Result Value Ref Range    Group & Rh A POS     Indirect Gema GEL NEG     Specimen Outdate 2024 23:59    CBC with Differential    Collection Time: 24  7:19 AM   Result Value Ref Range    WBC 14.04 (H) 4.50 - 11.50 x10(3)/mcL    RBC 3.90 (L) 4.20 - 5.40 x10(6)/mcL    Hgb 11.4 (L) 12.0 - 16.0 g/dL    Hct 33.3 (L) 37.0 - 47.0 %    MCV 85.4 80.0 - 94.0 fL    MCH 29.2 27.0 - 31.0 pg    MCHC 34.2 33.0 - 36.0 g/dL    RDW 11.9 11.5 - 17.0 %    Platelet 196 130 - 400 x10(3)/mcL    MPV 10.2 7.4 - 10.4 fL    Neut % 76.7 %    Lymph % 13.4 %    Mono % 4.3 %    Eos % 0.4 %    Basophil % 0.4 %    Lymph # 1.88 0.6 - 4.6 x10(3)/mcL    Neut # 10.77 (H) 2.1 - 9.2 x10(3)/mcL    Mono # 0.60 0.1 - 1.3  x10(3)/mcL    Eos # 0.05 0 - 0.9 x10(3)/mcL    Baso # 0.06 <=0.2 x10(3)/mcL    IG# 0.68 (H) 0 - 0.04 x10(3)/mcL    IG% 4.8 %    NRBC% 0.0 %         Assessment:       34 y.o.  at 30w2d weeks gestation admitted for  contractions, now PPROM 10/31.     Active Hospital Problems    Diagnosis  POA    *29 weeks gestation of pregnancy [Z3A.29]  Not Applicable     premature rupture of membranes [O42.919]  No    Short cervix affecting pregnancy [O26.879]  Yes     labor [O60.00]  Yes    Positive fetal fibronectin at 22 weeks to 34 weeks gestation [O09.899, R87.89]  Not Applicable      Resolved Hospital Problems   No resolved problems to display.        Plan:     1. PPROM  She was admitted for  ctx and unfortunately had PPROM on 10/31.   Patient is admitted with  premature rupture of membranes. The patient was counseled regarding the need for inpatient management including maternal and fetal monitoring for signs and symptoms of chorioamnionitis, abruption, labor, and fetal wellbeing. We reviewed prematurity complications and discussed delivery timing recommendations.    Recheck by Dr. Burgess showed 1cm dilated with head not applied. We will continue to monitor.     Recommendations:  S/p steroids within 1 week (29 weeks)- After observation today, started rescue course of steroids as ctx are persistent.   S/p magnesium sulfate for neuroprotection  Magnesium sulfate should be re-initiated (with a 6 gram loading dose if it has been > 6 hours since discontinuing a prior course) if delivery is thought to be imminent prior to 32 weeks gestation.  Avoid tocolysis (nifedipine and indocin)  GBS culture (POS)/ latency antibiotics- she has multiple allergies to antibiotics (all childhood rxns and she is unsure). She was started on clindamycin due to concern for cephalosporin allergy. - Currently on PO clindamycin x 5 days.  As she has limited options. I recommend adding 24 hrs gentamicin with  1.5mg/kg x 3 doses q8. (Completed)  Obtain estimated fetal weight, assess fetal presentation with ultrasound  Reassess fetal growth every 3 weeks (completed 10/31)  NICU consultation complete  Lactation consultation  Delivery at 34 weeks or earlier, if clinically indicated  NST TID x 1 hr    2. FWB  - S/p steroids within 1 week  - NICU consult complete  - NST TID x 1 hr    3. Ctx  - No indocin or nifedipine as she is ruptured.   - Rescue steroids started.   - Restart magnesium if delivery appears imminent.       Thank you for allowing us to participate in the care of your patient. If you have any questions/concerns, please do not hesitate to contact us.       Jerri Tate MD  Maternal Fetal Medicine

## 2024-11-05 LAB
GROUP & RH: NORMAL
INDIRECT COOMBS: NORMAL
SPECIMEN OUTDATE: NORMAL

## 2024-11-05 PROCEDURE — 97161 PT EVAL LOW COMPLEX 20 MIN: CPT

## 2024-11-05 PROCEDURE — 36415 COLL VENOUS BLD VENIPUNCTURE: CPT | Performed by: OBSTETRICS & GYNECOLOGY

## 2024-11-05 PROCEDURE — 99233 SBSQ HOSP IP/OBS HIGH 50: CPT | Mod: ,,, | Performed by: OBSTETRICS & GYNECOLOGY

## 2024-11-05 PROCEDURE — A4216 STERILE WATER/SALINE, 10 ML: HCPCS | Performed by: OBSTETRICS & GYNECOLOGY

## 2024-11-05 PROCEDURE — 63600175 PHARM REV CODE 636 W HCPCS: Performed by: OBSTETRICS & GYNECOLOGY

## 2024-11-05 PROCEDURE — 86850 RBC ANTIBODY SCREEN: CPT | Performed by: OBSTETRICS & GYNECOLOGY

## 2024-11-05 PROCEDURE — 25000003 PHARM REV CODE 250: Performed by: OBSTETRICS & GYNECOLOGY

## 2024-11-05 PROCEDURE — 11000001 HC ACUTE MED/SURG PRIVATE ROOM

## 2024-11-05 PROCEDURE — 63600175 PHARM REV CODE 636 W HCPCS: Mod: JZ,JG | Performed by: OBSTETRICS & GYNECOLOGY

## 2024-11-05 RX ADMIN — ALTEPLASE 2 MG: 2.2 INJECTION, POWDER, LYOPHILIZED, FOR SOLUTION INTRAVENOUS at 10:11

## 2024-11-05 RX ADMIN — CLINDAMYCIN HYDROCHLORIDE 300 MG: 150 CAPSULE ORAL at 04:11

## 2024-11-05 RX ADMIN — CLINDAMYCIN HYDROCHLORIDE 300 MG: 150 CAPSULE ORAL at 08:11

## 2024-11-05 RX ADMIN — DOCUSATE SODIUM 100 MG: 100 CAPSULE, LIQUID FILLED ORAL at 08:11

## 2024-11-05 RX ADMIN — SODIUM CHLORIDE, PRESERVATIVE FREE 10 ML: 5 INJECTION INTRAVENOUS at 12:11

## 2024-11-05 RX ADMIN — CLINDAMYCIN HYDROCHLORIDE 300 MG: 150 CAPSULE ORAL at 10:11

## 2024-11-05 RX ADMIN — BETAMETHASONE SODIUM PHOSPHATE AND BETAMETHASONE ACETATE 12 MG: 3; 3 INJECTION, SUSPENSION INTRA-ARTICULAR; INTRALESIONAL; INTRAMUSCULAR at 04:11

## 2024-11-05 NOTE — PROGRESS NOTES
PPROM 33w3d    Having occasional contractions, no bleding, good FM, FHT reassuring    Occasional fetal deceleration noted but none sustained or repetitive.     Continue to  obs

## 2024-11-05 NOTE — PROGRESS NOTES
Progress Note  Maternal Fetal Medicine          Subjective:         Gretchen Ron is a 34 y.o.  female with olson IUP at 30w2d who is admitted for short cervix, contractions, now s/p PPROM on 10/31.     Ctxs are still present but she says the intensity is improved and not hurting. Started rescue steroids yesterday. Occasional fetal decelerations.       Objective:         Temp:  [97.9 °F (36.6 °C)-98.5 °F (36.9 °C)] 98.1 °F (36.7 °C)  Pulse:  [75-90] 76  Resp:  [18] 18  SpO2:  [96 %] 96 %  BP: (107-120)/(56-65) 120/65    Gen: NAD, A&Ox3  Pulm: Unlabored breathing, LCTAB  Card: RRR  Abd: FHT present, soft, nondistended, nontender to palpation, gravid uterus palpable c/w gestational age  Extremities: Palpable peripheral pulses, no pedal edema, 2+ DTRs x 4    NST: 135 baseline, moderate BTBV, pos accelerations, no decels  [place]: 1-2 per hr  Limited OB ultrasound on 10/27/24: vertex presentation, anterior placenta, ALANNA 16cm, TVU-CL 3cm  10/31- Vtx. ALANNA 4.6cm, EFW 1363g (43%), oligo noted (PPROM)  - ALANNA 2.4cm. Vtx.    Lab Review  Blood Type: A POS      Recent Results (from the past 24 hours)   Type & Screen    Collection Time: 24  7:47 AM   Result Value Ref Range    Group & Rh A POS     Indirect Gema GEL NEG     Specimen Outdate 2024 23:59          Assessment:       34 y.o.  at 30w3d weeks gestation admitted for  contractions, now PPROM 10/31.     Active Hospital Problems    Diagnosis  POA    *29 weeks gestation of pregnancy [Z3A.29]  Not Applicable     premature rupture of membranes [O42.919]  No    Short cervix affecting pregnancy [O26.879]  Yes     labor [O60.00]  Yes    Positive fetal fibronectin at 22 weeks to 34 weeks gestation [O09.899, R87.89]  Not Applicable      Resolved Hospital Problems   No resolved problems to display.        Plan:     1. PPROM  She was admitted for  ctx and unfortunately had PPROM on 10/31.   Patient is admitted with   premature rupture of membranes. The patient was counseled regarding the need for inpatient management including maternal and fetal monitoring for signs and symptoms of chorioamnionitis, abruption, labor, and fetal wellbeing. We reviewed prematurity complications and discussed delivery timing recommendations.    Recheck by Dr. Burgess showed 1cm dilated with head not applied. We will continue to monitor.     Recommendations:  S/p steroids within 1 week (29 weeks)- started rescue course of steroids as ctx are persistent.   S/p magnesium sulfate for neuroprotection  Magnesium sulfate should be re-initiated (with a 6 gram loading dose if it has been > 6 hours since discontinuing a prior course) if delivery is thought to be imminent prior to 32 weeks gestation.  Avoid tocolysis (nifedipine and indocin)  GBS culture (POS)/ latency antibiotics- she has multiple allergies to antibiotics (all childhood rxns and she is unsure). She was started on clindamycin due to concern for cephalosporin allergy. - Currently on PO clindamycin x 5 days.  As she has limited options. I recommend adding 24 hrs gentamicin with 1.5mg/kg x 3 doses q8. (Completed)  Obtain estimated fetal weight, assess fetal presentation with ultrasound (Thursdays)  Reassess fetal growth every 3 weeks (completed 10/31)  NICU consultation complete  Lactation consultation  Delivery at 34 weeks or earlier, if clinically indicated  NST TID x 1 hr    2. FWB  - S/p steroids within 1 week- rescue steroids 11/4-5  - NICU consult complete  - CEFM currently    3. Ctx  - No indocin or nifedipine as she is ruptured.   - Rescue steroids 11/4-5  - Restart magnesium if delivery appears imminent.       Thank you for allowing us to participate in the care of your patient. If you have any questions/concerns, please do not hesitate to contact us.       Jerri Tate MD  Maternal Fetal Medicine

## 2024-11-05 NOTE — PROGRESS NOTES
[facility] - Antepartum  Obstetrics  Antepartum Progress Note    Patient Name: Gretchen Ron  MRN: [id number]  Admission Date: 10/30/2024  Hospital Length of Stay: 4 days  Attending Physician: Tong Burgess MD  Primary Care Provider: Kenzie Martinez MD    Subjective:     Principal Problem:29 weeks gestation of pregnancy    HPI: received celestone yesterday. Feeling occaisional contractions, had one prolonged variable deceleration. No bleeding    Obstetric HPI:  Patient reports occaisional contractions, active fetal movement, present vaginal bleeding , present loss of fluid      Objective:     Vital Signs (Most Recent):  Temp: 98.5 °F (36.9 °C) (11/05/24 0720)  Pulse: 80 (11/05/24 0720)  Resp: 18 (11/05/24 0434)  BP: (!) 107/56 (11/05/24 0720)  SpO2: 96 % (11/04/24 2332) Vital Signs (24h Range):  Temp:  [97.9 °F (36.6 °C)-98.5 °F (36.9 °C)] 98.5 °F (36.9 °C)  Pulse:  [75-90] 80  Resp:  [18] 18  SpO2:  [96 %-98 %] 96 %  BP: (107-129)/(56-72) 107/56     Weight: 79.4 kg (175 lb)  Body mass index is 29.12 kg/m².    FHT: 145Cat 0 (reassuring)  TOCO:  Q 10 minutes      Intake/Output Summary (Last 24 hours) at 11/5/2024 0746  Last data filed at 11/5/2024 0001  Gross per 24 hour   Intake 20 ml   Output --   Net 20 ml       Physical Exam:   Constitutional: She appears well-developed and well-nourished.    HENT:   Head: Atraumatic.    Eyes: Pupils are equal, round, and reactive to light.     Cardiovascular:  Intact distal pulses.             Pulmonary/Chest: Breath sounds normal.        Abdominal: Bowel sounds are normal.     Genitourinary:    Vagina normal.             Musculoskeletal: Normal range of motion.       Neurological: She is alert.    Skin: Skin is warm and dry.    Psychiatric: She has a normal mood and affect.       Cervical Exam:  Dilation:  1  Effacement:  90%  Station: -2  Presentation: Vertex     Significant Labs:  Recent Lab Results       None            Assessment/Plan:     34 y.o.  female  at 30w3d for:    Active Diagnoses:    Diagnosis Date Noted POA    PRINCIPAL PROBLEM:  29 weeks gestation of pregnancy [Z3A.29] 10/28/2024 Not Applicable     premature rupture of membranes [O42.919] 10/31/2024 No    Short cervix affecting pregnancy [O26.879] 10/30/2024 Yes     labor [O60.00] 10/27/2024 Yes    Positive fetal fibronectin at 22 weeks to 34 weeks gestation [O09.899, R87.89] 10/27/2024 Not Applicable      Problems Resolved During this Admission:       303D pprom  CONT OBS      Tong Burgess MD  Obstetrics  [facility] - Antepartum

## 2024-11-05 NOTE — PLAN OF CARE
Problem: Adult Inpatient Plan of Care  Goal: Plan of Care Review  Outcome: Progressing  Goal: Patient-Specific Goal (Individualized)  Outcome: Progressing  Goal: Absence of Hospital-Acquired Illness or Injury  Outcome: Progressing  Goal: Optimal Comfort and Wellbeing  Outcome: Progressing  Goal: Readiness for Transition of Care  Outcome: Progressing     Problem:  Fall Injury Risk  Goal: Absence of Fall, Infant Drop and Related Injury  Outcome: Progressing     Problem: Infection  Goal: Absence of Infection Signs and Symptoms  Outcome: Progressing     Problem:  Labor  Goal: Delayed  Delivery  Outcome: Progressing      PACU

## 2024-11-05 NOTE — PT/OT/SLP EVAL
Physical Therapy Evaluation and Discharge Note    Patient Name:  Gretchen Ron   MRN:  [id number]    Recommendations:     Discharge therapy intensity: No Therapy Indicated   Discharge Equipment Recommendations: none   Barriers to discharge: Ongoing medical needs    Assessment:     Gretchen Ron is a 34 y.o. female admitted with a medical diagnosis of  contractions with premature rupture of membranes. PT consulted for range of motion activities in bed. Upon arrival, patient with full AROM of fall extremities, sitting on donut cushion with c/o lower back pain. PT educated patient on figure 4 stretch in bed and lower trunk rotations in hook-lying. Patient on strict bedrest with only gait to restroom. PT educated patient on using wedges and naga-mat cushion and compliance with turning and changing position. PT ordered wedge and supplied naga-mat to exchange for donut cushion. PT to sign off as therapy is very limited by bedrest restrictions at this time. Please re-consult if patient has needs.    Recent Surgery: * No surgery found *      Plan:     During this hospitalization, patient does not require further acute PT services.  Please re-consult if situation changes.      Subjective     Chief Complaint: low back pain  Patient/Family Comments/goals: none stated  Pain/Comfort:  Pain Rating 1: 0/10    Patients cultural, spiritual, Latter day conflicts given the current situation: no    Living Environment:  Prior to admission, patients level of function was independent.  Equipment used at home: none.  DME owned (not currently used): none.  Upon discharge, patient will have assistance from family.    Objective:     Communicated with RN prior to session.  Patient found HOB elevated with  (fetal heart monitor) upon PT entry to room.    General Precautions: Standard,  (bedrest- bathroom trips only)    Orthopedic Precautions:N/A   Braces: N/A  Respiratory Status: Room air    Exams:  RLE ROM: WFL  RLE Strength: WFL  LLE ROM:  WFL  LLE Strength: WFL    Functional Mobility:  Independent with all bed mobility, OOB mobility not assessed.    AM-PAC 6 CLICK MOBILITY  Total Score:24       Treatment and Education:    Patient provided with verbal education and demonstrations education regarding  stretches for sciatic area and low back rotations, positioning in bed to improve support .  Understanding was verbalized.     Patient left HOB elevated with all lines intact and RN notified.    GOALS:   Multidisciplinary Problems       Physical Therapy Goals       Not on file                    History:     Past Medical History:   Diagnosis Date    ADHD (attention deficit hyperactivity disorder)     Anxiety        History reviewed. No pertinent surgical history.    Time Tracking:     PT Received On: 11/05/24  PT Start Time: 1050     PT Stop Time: 1105  PT Total Time (min): 15 min     Billable Minutes: Evaluation Low complexity      11/05/2024

## 2024-11-06 ENCOUNTER — ANESTHESIA EVENT (OUTPATIENT)
Dept: OBSTETRICS AND GYNECOLOGY | Facility: HOSPITAL | Age: 34
End: 2024-11-06
Payer: COMMERCIAL

## 2024-11-06 ENCOUNTER — ANESTHESIA (OUTPATIENT)
Dept: OBSTETRICS AND GYNECOLOGY | Facility: HOSPITAL | Age: 34
End: 2024-11-06
Payer: COMMERCIAL

## 2024-11-06 ENCOUNTER — PATIENT OUTREACH (OUTPATIENT)
Dept: ADMINISTRATIVE | Facility: CLINIC | Age: 34
End: 2024-11-06
Payer: COMMERCIAL

## 2024-11-06 VITALS — RESPIRATION RATE: 18 BRPM

## 2024-11-06 PROBLEM — O36.8390 NON-REASSURING FETAL HEART RATE OR RHYTHM AFFECTING MOTHER: Status: ACTIVE | Noted: 2024-11-06

## 2024-11-06 PROCEDURE — 63600175 PHARM REV CODE 636 W HCPCS: Performed by: OBSTETRICS & GYNECOLOGY

## 2024-11-06 PROCEDURE — 63600175 PHARM REV CODE 636 W HCPCS: Mod: JZ,JG | Performed by: OBSTETRICS & GYNECOLOGY

## 2024-11-06 PROCEDURE — 87075 CULTR BACTERIA EXCEPT BLOOD: CPT | Performed by: OBSTETRICS & GYNECOLOGY

## 2024-11-06 PROCEDURE — 25000003 PHARM REV CODE 250: Performed by: OBSTETRICS & GYNECOLOGY

## 2024-11-06 PROCEDURE — 25000003 PHARM REV CODE 250: Performed by: ANESTHESIOLOGY

## 2024-11-06 PROCEDURE — 87184 SC STD DISK METHOD PER PLATE: CPT | Performed by: OBSTETRICS & GYNECOLOGY

## 2024-11-06 PROCEDURE — 51702 INSERT TEMP BLADDER CATH: CPT

## 2024-11-06 PROCEDURE — 11000001 HC ACUTE MED/SURG PRIVATE ROOM

## 2024-11-06 PROCEDURE — 72200006 HC VAGINAL DELIVERY LEVEL III

## 2024-11-06 PROCEDURE — 59409 OBSTETRICAL CARE: CPT | Mod: AA,P2,, | Performed by: ANESTHESIOLOGY

## 2024-11-06 PROCEDURE — 63600175 PHARM REV CODE 636 W HCPCS

## 2024-11-06 PROCEDURE — 62326 NJX INTERLAMINAR LMBR/SAC: CPT | Performed by: ANESTHESIOLOGY

## 2024-11-06 PROCEDURE — 63600175 PHARM REV CODE 636 W HCPCS: Mod: JZ,JG | Performed by: ANESTHESIOLOGY

## 2024-11-06 PROCEDURE — 99233 SBSQ HOSP IP/OBS HIGH 50: CPT | Mod: ,,, | Performed by: OBSTETRICS & GYNECOLOGY

## 2024-11-06 RX ORDER — OXYTOCIN-SODIUM CHLORIDE 0.9% IV SOLN 30 UNIT/500ML 30-0.9/5 UT/ML-%
30 SOLUTION INTRAVENOUS ONCE AS NEEDED
Status: DISCONTINUED | OUTPATIENT
Start: 2024-11-06 | End: 2024-11-08 | Stop reason: HOSPADM

## 2024-11-06 RX ORDER — CARBOPROST TROMETHAMINE 250 UG/ML
250 INJECTION, SOLUTION INTRAMUSCULAR
Status: DISCONTINUED | OUTPATIENT
Start: 2024-11-06 | End: 2024-11-06

## 2024-11-06 RX ORDER — OXYCODONE AND ACETAMINOPHEN 5; 325 MG/1; MG/1
1 TABLET ORAL EVERY 6 HOURS PRN
Status: DISCONTINUED | OUTPATIENT
Start: 2024-11-06 | End: 2024-11-08 | Stop reason: HOSPADM

## 2024-11-06 RX ORDER — OXYTOCIN-SODIUM CHLORIDE 0.9% IV SOLN 30 UNIT/500ML 30-0.9/5 UT/ML-%
95 SOLUTION INTRAVENOUS ONCE AS NEEDED
Status: DISCONTINUED | OUTPATIENT
Start: 2024-11-06 | End: 2024-11-08 | Stop reason: HOSPADM

## 2024-11-06 RX ORDER — SODIUM CHLORIDE 0.9 % (FLUSH) 0.9 %
2 SYRINGE (ML) INJECTION
Status: DISCONTINUED | OUTPATIENT
Start: 2024-11-06 | End: 2024-11-08 | Stop reason: HOSPADM

## 2024-11-06 RX ORDER — OXYTOCIN-SODIUM CHLORIDE 0.9% IV SOLN 30 UNIT/500ML 30-0.9/5 UT/ML-%
0-30 SOLUTION INTRAVENOUS CONTINUOUS
Status: DISCONTINUED | OUTPATIENT
Start: 2024-11-06 | End: 2024-11-08 | Stop reason: HOSPADM

## 2024-11-06 RX ORDER — SIMETHICONE 80 MG
1 TABLET,CHEWABLE ORAL 4 TIMES DAILY PRN
Status: DISCONTINUED | OUTPATIENT
Start: 2024-11-06 | End: 2024-11-06

## 2024-11-06 RX ORDER — OXYTOCIN-SODIUM CHLORIDE 0.9% IV SOLN 30 UNIT/500ML 30-0.9/5 UT/ML-%
10 SOLUTION INTRAVENOUS ONCE AS NEEDED
OUTPATIENT
Start: 2024-11-06 | End: 2036-04-04

## 2024-11-06 RX ORDER — MAGNESIUM SULFATE HEPTAHYDRATE 40 MG/ML
INJECTION, SOLUTION INTRAVENOUS
Status: COMPLETED
Start: 2024-11-06 | End: 2024-11-06

## 2024-11-06 RX ORDER — OXYTOCIN-SODIUM CHLORIDE 0.9% IV SOLN 30 UNIT/500ML 30-0.9/5 UT/ML-%
95 SOLUTION INTRAVENOUS CONTINUOUS PRN
Status: DISCONTINUED | OUTPATIENT
Start: 2024-11-06 | End: 2024-11-08 | Stop reason: HOSPADM

## 2024-11-06 RX ORDER — HYDROXYZINE PAMOATE 25 MG/1
25 CAPSULE ORAL EVERY 8 HOURS PRN
Status: DISCONTINUED | OUTPATIENT
Start: 2024-11-06 | End: 2024-11-08 | Stop reason: HOSPADM

## 2024-11-06 RX ORDER — CALCIUM GLUCONATE 98 MG/ML
1 INJECTION, SOLUTION INTRAVENOUS
Status: DISCONTINUED | OUTPATIENT
Start: 2024-11-06 | End: 2024-11-08 | Stop reason: HOSPADM

## 2024-11-06 RX ORDER — OXYTOCIN-SODIUM CHLORIDE 0.9% IV SOLN 30 UNIT/500ML 30-0.9/5 UT/ML-%
10 SOLUTION INTRAVENOUS ONCE AS NEEDED
Status: COMPLETED | OUTPATIENT
Start: 2024-11-06 | End: 2024-11-06

## 2024-11-06 RX ORDER — CARBOPROST TROMETHAMINE 250 UG/ML
250 INJECTION, SOLUTION INTRAMUSCULAR
Status: DISCONTINUED | OUTPATIENT
Start: 2024-11-06 | End: 2024-11-08 | Stop reason: HOSPADM

## 2024-11-06 RX ORDER — SIMETHICONE 80 MG
1 TABLET,CHEWABLE ORAL EVERY 4 HOURS PRN
Status: DISCONTINUED | OUTPATIENT
Start: 2024-11-06 | End: 2024-11-08 | Stop reason: HOSPADM

## 2024-11-06 RX ORDER — METHYLERGONOVINE MALEATE 0.2 MG/ML
200 INJECTION INTRAVENOUS ONCE AS NEEDED
Status: DISCONTINUED | OUTPATIENT
Start: 2024-11-06 | End: 2024-11-08 | Stop reason: HOSPADM

## 2024-11-06 RX ORDER — OXYTOCIN 10 [USP'U]/ML
10 INJECTION, SOLUTION INTRAMUSCULAR; INTRAVENOUS ONCE AS NEEDED
Status: DISCONTINUED | OUTPATIENT
Start: 2024-11-06 | End: 2024-11-08 | Stop reason: HOSPADM

## 2024-11-06 RX ORDER — DIPHENOXYLATE HYDROCHLORIDE AND ATROPINE SULFATE 2.5; .025 MG/1; MG/1
2 TABLET ORAL EVERY 6 HOURS PRN
Status: DISCONTINUED | OUTPATIENT
Start: 2024-11-06 | End: 2024-11-06

## 2024-11-06 RX ORDER — OXYTOCIN 10 [USP'U]/ML
10 INJECTION, SOLUTION INTRAMUSCULAR; INTRAVENOUS ONCE AS NEEDED
Status: DISCONTINUED | OUTPATIENT
Start: 2024-11-06 | End: 2024-11-06

## 2024-11-06 RX ORDER — METHYLERGONOVINE MALEATE 0.2 MG/ML
200 INJECTION INTRAVENOUS ONCE AS NEEDED
Status: DISCONTINUED | OUTPATIENT
Start: 2024-11-06 | End: 2024-11-06

## 2024-11-06 RX ORDER — FAMOTIDINE 10 MG/ML
20 INJECTION INTRAVENOUS ONCE
Status: DISCONTINUED | OUTPATIENT
Start: 2024-11-06 | End: 2024-11-08 | Stop reason: HOSPADM

## 2024-11-06 RX ORDER — OXYCODONE AND ACETAMINOPHEN 10; 325 MG/1; MG/1
1 TABLET ORAL EVERY 6 HOURS PRN
Status: DISCONTINUED | OUTPATIENT
Start: 2024-11-06 | End: 2024-11-08 | Stop reason: HOSPADM

## 2024-11-06 RX ORDER — HYDROCORTISONE 25 MG/G
CREAM TOPICAL 3 TIMES DAILY PRN
Status: DISCONTINUED | OUTPATIENT
Start: 2024-11-06 | End: 2024-11-08 | Stop reason: HOSPADM

## 2024-11-06 RX ORDER — DOCUSATE SODIUM 100 MG/1
200 CAPSULE, LIQUID FILLED ORAL 2 TIMES DAILY PRN
Status: DISCONTINUED | OUTPATIENT
Start: 2024-11-06 | End: 2024-11-08 | Stop reason: HOSPADM

## 2024-11-06 RX ORDER — HYDROXYZINE PAMOATE 25 MG/1
25 CAPSULE ORAL EVERY 8 HOURS PRN
Status: DISCONTINUED | OUTPATIENT
Start: 2024-11-06 | End: 2024-11-06

## 2024-11-06 RX ORDER — MISOPROSTOL 100 UG/1
800 TABLET ORAL ONCE AS NEEDED
Status: DISCONTINUED | OUTPATIENT
Start: 2024-11-06 | End: 2024-11-08 | Stop reason: HOSPADM

## 2024-11-06 RX ORDER — MISOPROSTOL 100 UG/1
800 TABLET ORAL ONCE AS NEEDED
Status: DISCONTINUED | OUTPATIENT
Start: 2024-11-06 | End: 2024-11-06

## 2024-11-06 RX ORDER — SODIUM CHLORIDE, SODIUM LACTATE, POTASSIUM CHLORIDE, CALCIUM CHLORIDE 600; 310; 30; 20 MG/100ML; MG/100ML; MG/100ML; MG/100ML
INJECTION, SOLUTION INTRAVENOUS CONTINUOUS
Status: DISCONTINUED | OUTPATIENT
Start: 2024-11-06 | End: 2024-11-08 | Stop reason: HOSPADM

## 2024-11-06 RX ORDER — DIPHENHYDRAMINE HCL 25 MG
25 CAPSULE ORAL EVERY 4 HOURS PRN
Status: DISCONTINUED | OUTPATIENT
Start: 2024-11-06 | End: 2024-11-08 | Stop reason: HOSPADM

## 2024-11-06 RX ORDER — OXYCODONE AND ACETAMINOPHEN 5; 325 MG/1; MG/1
1 TABLET ORAL EVERY 4 HOURS PRN
Status: DISCONTINUED | OUTPATIENT
Start: 2024-11-06 | End: 2024-11-08 | Stop reason: HOSPADM

## 2024-11-06 RX ORDER — EPHEDRINE SULFATE 50 MG/ML
10 INJECTION, SOLUTION INTRAVENOUS
Status: DISCONTINUED | OUTPATIENT
Start: 2024-11-06 | End: 2024-11-08 | Stop reason: HOSPADM

## 2024-11-06 RX ORDER — CALCIUM CARBONATE 200(500)MG
500 TABLET,CHEWABLE ORAL 3 TIMES DAILY PRN
Status: DISCONTINUED | OUTPATIENT
Start: 2024-11-06 | End: 2024-11-08 | Stop reason: HOSPADM

## 2024-11-06 RX ORDER — ONDANSETRON 4 MG/1
8 TABLET, ORALLY DISINTEGRATING ORAL EVERY 8 HOURS PRN
Status: DISCONTINUED | OUTPATIENT
Start: 2024-11-06 | End: 2024-11-06

## 2024-11-06 RX ORDER — ONDANSETRON 4 MG/1
8 TABLET, ORALLY DISINTEGRATING ORAL EVERY 8 HOURS PRN
Status: DISCONTINUED | OUTPATIENT
Start: 2024-11-06 | End: 2024-11-08 | Stop reason: HOSPADM

## 2024-11-06 RX ORDER — LIDOCAINE HYDROCHLORIDE 10 MG/ML
10 INJECTION, SOLUTION INFILTRATION; PERINEURAL ONCE AS NEEDED
Status: DISCONTINUED | OUTPATIENT
Start: 2024-11-06 | End: 2024-11-08 | Stop reason: HOSPADM

## 2024-11-06 RX ORDER — CLINDAMYCIN IN PERCENT DEXTROSE 6 MG/ML
300 INJECTION, SOLUTION INTRAVENOUS
Status: DISCONTINUED | OUTPATIENT
Start: 2024-11-06 | End: 2024-11-08 | Stop reason: HOSPADM

## 2024-11-06 RX ORDER — MAGNESIUM SULFATE HEPTAHYDRATE 40 MG/ML
6 INJECTION, SOLUTION INTRAVENOUS ONCE
Status: COMPLETED | OUTPATIENT
Start: 2024-11-06 | End: 2024-11-06

## 2024-11-06 RX ORDER — DIPHENHYDRAMINE HYDROCHLORIDE 50 MG/ML
25 INJECTION INTRAMUSCULAR; INTRAVENOUS EVERY 4 HOURS PRN
Status: DISCONTINUED | OUTPATIENT
Start: 2024-11-06 | End: 2024-11-08 | Stop reason: HOSPADM

## 2024-11-06 RX ORDER — BUPIVACAINE HYDROCHLORIDE 2.5 MG/ML
INJECTION, SOLUTION INFILTRATION; PERINEURAL
Status: DISCONTINUED | OUTPATIENT
Start: 2024-11-06 | End: 2024-11-06

## 2024-11-06 RX ORDER — DIPHENOXYLATE HYDROCHLORIDE AND ATROPINE SULFATE 2.5; .025 MG/1; MG/1
2 TABLET ORAL EVERY 6 HOURS PRN
Status: DISCONTINUED | OUTPATIENT
Start: 2024-11-06 | End: 2024-11-08 | Stop reason: HOSPADM

## 2024-11-06 RX ORDER — IBUPROFEN 600 MG/1
600 TABLET ORAL EVERY 6 HOURS
Status: DISCONTINUED | OUTPATIENT
Start: 2024-11-07 | End: 2024-11-08 | Stop reason: HOSPADM

## 2024-11-06 RX ORDER — ACETAMINOPHEN 325 MG/1
325 TABLET ORAL EVERY 4 HOURS PRN
Status: DISCONTINUED | OUTPATIENT
Start: 2024-11-06 | End: 2024-11-08 | Stop reason: HOSPADM

## 2024-11-06 RX ORDER — MAGNESIUM SULFATE HEPTAHYDRATE 40 MG/ML
2 INJECTION, SOLUTION INTRAVENOUS CONTINUOUS
Status: DISCONTINUED | OUTPATIENT
Start: 2024-11-06 | End: 2024-11-08 | Stop reason: HOSPADM

## 2024-11-06 RX ORDER — MUPIROCIN 20 MG/G
OINTMENT TOPICAL
OUTPATIENT
Start: 2024-11-06

## 2024-11-06 RX ORDER — ONDANSETRON HYDROCHLORIDE 2 MG/ML
8 INJECTION, SOLUTION INTRAVENOUS ONCE
Status: COMPLETED | OUTPATIENT
Start: 2024-11-06 | End: 2024-11-06

## 2024-11-06 RX ORDER — SODIUM CHLORIDE, SODIUM LACTATE, POTASSIUM CHLORIDE, CALCIUM CHLORIDE 600; 310; 30; 20 MG/100ML; MG/100ML; MG/100ML; MG/100ML
1000 INJECTION, SOLUTION INTRAVENOUS CONTINUOUS
Status: ACTIVE | OUTPATIENT
Start: 2024-11-06 | End: 2024-11-07

## 2024-11-06 RX ORDER — FENTANYL/BUPIVACAINE/NS/PF 2-1250MCG
PLASTIC BAG, INJECTION (ML) INJECTION CONTINUOUS PRN
Status: DISCONTINUED | OUTPATIENT
Start: 2024-11-06 | End: 2024-11-06

## 2024-11-06 RX ORDER — FENTANYL CITRATE 50 UG/ML
INJECTION, SOLUTION INTRAMUSCULAR; INTRAVENOUS
Status: DISCONTINUED | OUTPATIENT
Start: 2024-11-06 | End: 2024-11-06

## 2024-11-06 RX ORDER — BUPIVACAINE HYDROCHLORIDE 2.5 MG/ML
INJECTION, SOLUTION EPIDURAL; INFILTRATION; INTRACAUDAL
Status: COMPLETED | OUTPATIENT
Start: 2024-11-06 | End: 2024-11-06

## 2024-11-06 RX ORDER — SODIUM CITRATE AND CITRIC ACID MONOHYDRATE 334; 500 MG/5ML; MG/5ML
30 SOLUTION ORAL ONCE
Status: COMPLETED | OUTPATIENT
Start: 2024-11-06 | End: 2024-11-06

## 2024-11-06 RX ORDER — OXYCODONE AND ACETAMINOPHEN 10; 325 MG/1; MG/1
1 TABLET ORAL EVERY 4 HOURS PRN
Status: DISCONTINUED | OUTPATIENT
Start: 2024-11-06 | End: 2024-11-08 | Stop reason: HOSPADM

## 2024-11-06 RX ORDER — OXYTOCIN-SODIUM CHLORIDE 0.9% IV SOLN 30 UNIT/500ML 30-0.9/5 UT/ML-%
95 SOLUTION INTRAVENOUS CONTINUOUS PRN
OUTPATIENT
Start: 2024-11-06

## 2024-11-06 RX ADMIN — SODIUM CHLORIDE, POTASSIUM CHLORIDE, SODIUM LACTATE AND CALCIUM CHLORIDE: 600; 310; 30; 20 INJECTION, SOLUTION INTRAVENOUS at 08:11

## 2024-11-06 RX ADMIN — BUPIVACAINE HYDROCHLORIDE 10 ML: 2.5 INJECTION, SOLUTION EPIDURAL; INFILTRATION; INTRACAUDAL; PERINEURAL at 02:11

## 2024-11-06 RX ADMIN — SODIUM CHLORIDE, POTASSIUM CHLORIDE, SODIUM LACTATE AND CALCIUM CHLORIDE 75 ML: 600; 310; 30; 20 INJECTION, SOLUTION INTRAVENOUS at 08:11

## 2024-11-06 RX ADMIN — Medication 2 MILLI-UNITS/MIN: at 10:11

## 2024-11-06 RX ADMIN — SODIUM CHLORIDE, POTASSIUM CHLORIDE, SODIUM LACTATE AND CALCIUM CHLORIDE 1000 ML: 600; 310; 30; 20 INJECTION, SOLUTION INTRAVENOUS at 02:11

## 2024-11-06 RX ADMIN — SODIUM CITRATE AND CITRIC ACID MONOHYDRATE 30 ML: 500; 334 SOLUTION ORAL at 02:11

## 2024-11-06 RX ADMIN — HYDROXYZINE PAMOATE 25 MG: 25 CAPSULE ORAL at 12:11

## 2024-11-06 RX ADMIN — BUPIVACAINE HYDROCHLORIDE 8 ML: 2.5 INJECTION, SOLUTION INFILTRATION; PERINEURAL at 07:11

## 2024-11-06 RX ADMIN — IBUPROFEN 600 MG: 600 TABLET, FILM COATED ORAL at 10:11

## 2024-11-06 RX ADMIN — MAGNESIUM SULFATE HEPTAHYDRATE 6 G: 40 INJECTION, SOLUTION INTRAVENOUS at 08:11

## 2024-11-06 RX ADMIN — CLINDAMYCIN PHOSPHATE 300 MG: 300 INJECTION, SOLUTION INTRAVENOUS at 10:11

## 2024-11-06 RX ADMIN — ONDANSETRON 8 MG: 2 INJECTION INTRAMUSCULAR; INTRAVENOUS at 08:11

## 2024-11-06 RX ADMIN — Medication 14 ML/HR: at 02:11

## 2024-11-06 RX ADMIN — TRANEXAMIC ACID 1000 MG: 100 INJECTION, SOLUTION INTRAVENOUS at 08:11

## 2024-11-06 RX ADMIN — OXYTOCIN-SODIUM CHLORIDE 0.9% IV SOLN 30 UNIT/500ML 10 UNITS: 30-0.9/5 SOLUTION at 08:11

## 2024-11-06 RX ADMIN — FENTANYL CITRATE 100 MCG: 50 INJECTION, SOLUTION INTRAMUSCULAR; INTRAVENOUS at 07:11

## 2024-11-06 RX ADMIN — CLINDAMYCIN PHOSPHATE 300 MG: 300 INJECTION, SOLUTION INTRAVENOUS at 06:11

## 2024-11-06 NOTE — PLAN OF CARE
Problem: Labor  Goal: Hemostasis  Outcome: Progressing  Goal: Stable Fetal Wellbeing  Outcome: Progressing  Goal: Effective Progression to Delivery  Outcome: Progressing  Goal: Absence of Infection Signs and Symptoms  Outcome: Progressing  Goal: Acceptable Pain Control  Outcome: Progressing  Goal: Normal Uterine Contraction Pattern  Outcome: Progressing

## 2024-11-06 NOTE — ANESTHESIA PREPROCEDURE EVALUATION
2024  Gretchen Ron is a 34 y.o., female , , EGA 30 wks,  with PPROM at 28 5/6 weeks , in active labor who requests a BEVERLY for labor analgesia    LAB:      Pre-op Assessment    I have reviewed the Patient Summary Reports.     I have reviewed the Nursing Notes. I have reviewed the NPO Status.   I have reviewed the Medications.     Review of Systems  Anesthesia Hx:  No problems with previous Anesthesia             Denies Family Hx of Anesthesia complications.    Denies Personal Hx of Anesthesia complications.                    Social:  Non-Smoker       Cardiovascular:  Cardiovascular Normal                                              Pulmonary:  Pulmonary Normal                       Psych:     ADHD               Physical Exam  General: Alert and Oriented  In active labor  Airway:  Mallampati: II   Mouth Opening: Normal  TM Distance: Normal  Tongue: Normal  Neck ROM: Normal ROM    Dental:  Intact    Chest/Lungs:  Normal Respiratory Rate    Heart:  Rate: Normal  Rhythm: Regular Rhythm        Anesthesia Plan  Type of Anesthesia, risks & benefits discussed:    Anesthesia Type: Epidural  Intra-op Monitoring Plan: Standard ASA Monitors  Post Op Pain Control Plan: epidural analgesia  Induction:  IV  Informed Consent: Informed consent signed with the Patient and all parties understand the risks and agree with anesthesia plan.  All questions answered. Patient consented to blood products? Yes  ASA Score: 2  Day of Surgery Review of History & Physical: H&P Update referred to the surgeon/provider.  Anesthesia Plan Notes: Premedication: Oral Bicitra and Lactated Ringers Preload  Technique: Epidural for labor analgesia - plan to use if functioning well if C/S is required for delivery  Please refer to nursing records for toco fetal heart tone information    Ready For Surgery From Anesthesia Perspective.     .

## 2024-11-06 NOTE — NURSING
"informed MD of pt starting around 0400 recurrent lates and varibles with spont recovery to baseline. ctx approx 9-10mins. MD asks if variblity is good, Moderate variblity noted. Md states "as long as the varibility is good keep watching her. Dr Burgess or I will be by in around an hour to check on her".  "

## 2024-11-06 NOTE — ANESTHESIA PROCEDURE NOTES
Epidural    Patient location during procedure: OB   Reason for block: primary anesthetic   Reason for block: labor analgesia requested by patient and obstetrician  Diagnosis: Active Labor   Start time: 11/6/2024 1:59 PM  Timeout: 11/6/2024 1:57 PM  End time: 11/6/2024 2:26 PM    Staffing  Performing Provider: Alo Arias MD  Authorizing Provider: Alo Arias MD    Staffing  Performed by: Alo Arias MD  Authorized by: Alo Arias MD        Preanesthetic Checklist  Completed: patient identified, IV checked, site marked, risks and benefits discussed, surgical consent, monitors and equipment checked, pre-op evaluation, timeout performed, anesthesia consent given, hand hygiene performed and patient being monitored  Preparation  Patient position: sitting  Prep: ChloraPrep  Patient monitoring: Blood Pressure  Reason for block: primary anesthetic   Epidural  Skin Anesthetic: lidocaine 1%  Administration type: continuous  Approach: midline  Interspace: L3-4    Injection technique: JADA saline  Needle and Epidural Catheter  Needle type: Tuohy   Needle gauge: 17  Needle length: 3.5 inches  Catheter type: [employer]  Catheter size: 19 G  Insertion Attempts: 1  Test dose: 3 mL of lidocaine 1.5% with Epi 1-to-200,000  Additional Documentation: negative aspiration for heme and CSF, incremental injection, no paresthesia on injection, no signs/symptoms of IV or SA injection and no significant pain on injection  Needle localization: anatomical landmarks  Assessment  Ease of block: easy  Patient's tolerance of the procedure: no complaints and comfortable throughout block  Additional Notes  Initial Bolus: Marcaine 0.25%  (4+3+3) ml, incrementally injected, aspirating b/w each injection for a total of 10 ml.   PCE : Continuous Infusion:12 ml/Hr, Bolus Dose: 4 ml q 10 min, LO: 4/Hr . Patient reports good analgesia after epidural bolus.   No inadvertent dural puncture with Tuohy.  Dural puncture not  performed with spinal needle    Medications:    Medications: bupivacaine (pf) (MARCAINE) injection 0.25% - Epidural   10 mL - 11/6/2024 2:17:00 PM

## 2024-11-06 NOTE — PROGRESS NOTES
Progress Note  Maternal Fetal Medicine          Subjective:         Gretchen Ron is a 34 y.o.  female with olson IUP at 30w4d who is admitted for short cervix, contractions, now s/p PPROM on 10/31.     Prior to arriving for rounds this morning, I was notified of concerns for the fetal tracing.  Fetal late decelerations were noted with minimal variability.  I instructed nursing to start 1 L LR bolus.  The patient is continuing to contract and cervical exam shows 2-3 cm dilated.  Magnesium sulfate was re-initiated due to concern for imminent delivery prior to 32 weeks.  The patient is feeling okay.  Rescue steroids have been completed.  I am recommending proceeding with augmentation of labor due to fetal tracing.      Objective:         Temp:  [97.8 °F (36.6 °C)-98.3 °F (36.8 °C)] 98 °F (36.7 °C)  Pulse:  [69-91] 81  Resp:  [16-18] 18  SpO2:  [90 %-100 %] 97 %  BP: (100-128)/(51-79) 128/79    Gen: NAD, A&Ox3  Pulm: Unlabored breathing, LCTAB  Card: RRR  Abd: FHT present, soft, nondistended, nontender to palpation, gravid uterus palpable c/w gestational age  Extremities: Palpable peripheral pulses, no pedal edema, 2+ DTRs x 4    NST: 140 baseline, moderate BTBV, pos accelerations, no decels  [place]: q5-7mins  Limited OB ultrasound on 10/27/24: vertex presentation, anterior placenta, ALANNA 16cm, TVU-CL 3cm  10/31- Vtx. ALANNA 4.6cm, EFW 1363g (43%), oligo noted (PPROM)  - ALANNA 2.4cm. Vtx.  - vtx, anhydramnios    Lab Review  Blood Type: A POS      No results found for this or any previous visit (from the past 24 hours).        Assessment:       34 y.o.  at 30w4d weeks gestation admitted for  contractions, now PPROM 10/31.     Active Hospital Problems    Diagnosis  POA    *29 weeks gestation of pregnancy [Z3A.29]  Not Applicable     premature rupture of membranes [O42.919]  No    Short cervix affecting pregnancy [O26.879]  Yes     labor [O60.00]  Yes    Positive fetal fibronectin at  22 weeks to 34 weeks gestation [O09.899, R87.89]  Not Applicable      Resolved Hospital Problems   No resolved problems to display.        Plan:     1. PPROM  She was admitted for  ctx and unfortunately had PPROM on 10/31.   Patient is admitted with  premature rupture of membranes. The patient was counseled regarding the need for inpatient management including maternal and fetal monitoring for signs and symptoms of chorioamnionitis, abruption, labor, and fetal wellbeing. We reviewed prematurity complications and discussed delivery timing recommendations.    Recommendations:  S/p steroids within 1 week (29 weeks)- started rescue course- completed  S/p magnesium sulfate for neuroprotection  Magnesium sulfate should be re-initiated (with a 6 gram loading dose if it has been > 6 hours since discontinuing a prior course) if delivery is thought to be imminent prior to 32 weeks gestation.  Avoid tocolysis (nifedipine and indocin)  GBS culture (POS)/ latency antibiotics- she has multiple allergies to antibiotics (all childhood rxns and she is unsure). She was started on clindamycin due to concern for cephalosporin allergy.  As she has limited options. I recommend adding 24 hrs gentamicin with 1.5mg/kg x 3 doses q8.  Obtain estimated fetal weight, assess fetal presentation with ultrasound ()  Reassess fetal growth every 3 weeks (completed 10/31)  NICU consultation complete  Lactation consultation  Delivery at 34 weeks or earlier, if clinically indicated    - fetal decelerations noted with persistent category 2 tracing/some category 3.  We are currently resuscitating with IV fluids.  Magnesium sulfate has been re-initiated as we are moving forward with augmentation of labor due to fetal tracing concerns.  The patient is also veda additionally.  Continue to monitor fetal tracing and consider  section if needed due to persistent category 3 tracing.  Stop magnesium sulfate at  delivery.    2. FWB  - S/p steroids within 1 week- rescue steroids 11/4-5  - NICU consult complete  - CEFM currently          Thank you for allowing us to participate in the care of your patient. If you have any questions/concerns, please do not hesitate to contact us.       Jerri Tate MD  Maternal Fetal Medicine

## 2024-11-06 NOTE — PROGRESS NOTES
Inpatient Nutrition Assessment    Admit Date: 10/30/2024   Total duration of encounter: 7 days   Patient Age: 34 y.o.    Nutrition Recommendation/Prescription     Once medically feasible, resume regular diet.     Communication of Recommendations: reviewed with nurse    Nutrition Assessment       Chart Review    Reason Seen: length of stay    Malnutrition Screening Tool Results   Have you recently lost weight without trying?: No  Have you been eating poorly because of a decreased appetite?: No   MST Score: 0   Diagnosis:   premature rupture of membranes    Relevant Medical History: ADHD, anxiety    Scheduled Medications:  clindamycin IV (PEDS and ADULTS), 300 mg, Q8H  docusate sodium, 100 mg, Daily    Continuous Infusions:  lactated ringers  lactated ringers  magnesium sulfate in water  oxytocin, Last Rate: 2 cresencio-units/min (24 1025)    PRN Medications:  acetaminophen, 650 mg, Q6H PRN  calcium carbonate, 500 mg, TID PRN  calcium gluconate, 1 g, PRN  carboprost, 250 mcg, Q15 Min PRN  diphenoxylate-atropine 2.5-0.025 mg, 2 tablet, Q6H PRN  hydrOXYzine pamoate, 25 mg, Q8H PRN  lactated ringers, 1,000 mL, PRN  lactated ringers, 500 mL, PRN  lactated ringers, 500 mL, Once PRN  lactated ringers, 75 mL, PRN  LIDOcaine HCL 10 mg/ml (1%), 10 mL, Once PRN  methylergonovine, 200 mcg, Once PRN  miSOPROStoL, 800 mcg, Once PRN  miSOPROStoL, 800 mcg, Once PRN  ondansetron, 8 mg, Q8H PRN  oxytocin, 95 cresencio-units/min, Once PRN  oxytocin, 10 Units, Once PRN  oxytocin, 10 Units, Once PRN  simethicone, 1 tablet, QID PRN  sodium chloride 0.9%, 10 mL, Q12H PRN  tranexamic acid (CYKLOKAPRON) infusion, 1,000 mg, Q30 Min PRN    Calorie Containing IV Medications: no significant kcals from medications at this time    Recent Labs   Lab 10/30/24  1307 24  1812 24  0719   WBC 13.34* 13.98* 14.04*   HGB 10.8* 11.4* 11.4*   HCT 32.6* 34.4* 33.3*     Nutrition Orders:  Diet NPO      Appetite/Oral Intake: NPO/NPO  Factors  "Affecting Nutritional Intake: NPO  Social Needs Impacting Access to Food: none identified  Food/Baptism/Cultural Preferences: none reported  Food Allergies:  shrimp  Last Bowel Movement: 11/03/24  Wound(s):  none noted    Comments    11/6/24: Patient currently NPO due to actively in labor during rounds. Hx obtained from RN. RN reports patient with good oral intake.     Anthropometrics    Height: 5' 5" (165.1 cm), Height Method: Stated  Last Weight: 79.4 kg (175 lb) (11/05/24 2245), Weight Method: Standard Scale  BMI (Calculated): 29.1  BMI Classification: overweight (BMI 25-29.9)     Ideal Body Weight (IBW), Female: 125 lb     % Ideal Body Weight, Female (lb): 140 %                             Usual Weight Provided By: not applicable    Wt Readings from Last 5 Encounters:   11/05/24 79.4 kg (175 lb)   10/27/24 79.4 kg (175 lb)   03/01/24 64 kg (141 lb)   12/28/23 65.2 kg (143 lb 12.8 oz)   09/08/21 72.6 kg (160 lb 0.2 oz)     Weight Change(s) Since Admission: .  Wt Readings from Last 1 Encounters:   11/05/24 2245 79.4 kg (175 lb)   10/31/24 1858 79.4 kg (175 lb)   Admit Weight: 79.4 kg (175 lb) (10/31/24 1858), Weight Method: Standard Scale      Evaluation of Received Nutrient Intake    Calories: meeting estimated needs  Protein: meeting estimated needs    Patient Education     Not applicable.    Nutrition Goals & Monitoring     Dietitian will monitor: energy intake  Discharge planning: resume home regimen  Nutrition Risk/Follow-Up: low (follow-up in 5-7 days)   Please consult if re-assessment needed sooner.     "

## 2024-11-06 NOTE — PROGRESS NOTES
No TCC call made, as this encounter is pregnancy related and the patient has been readmitted as of 10/31/24-present.

## 2024-11-07 LAB
BASOPHILS # BLD AUTO: 0.03 X10(3)/MCL
BASOPHILS NFR BLD AUTO: 0.2 %
EOSINOPHIL # BLD AUTO: 0 X10(3)/MCL (ref 0–0.9)
EOSINOPHIL NFR BLD AUTO: 0 %
ERYTHROCYTE [DISTWIDTH] IN BLOOD BY AUTOMATED COUNT: 11.9 % (ref 11.5–17)
HCT VFR BLD AUTO: 27.3 % (ref 37–47)
HGB BLD-MCNC: 9.3 G/DL (ref 12–16)
IMM GRANULOCYTES # BLD AUTO: 0.37 X10(3)/MCL (ref 0–0.04)
IMM GRANULOCYTES NFR BLD AUTO: 1.9 %
LYMPHOCYTES # BLD AUTO: 1.42 X10(3)/MCL (ref 0.6–4.6)
LYMPHOCYTES NFR BLD AUTO: 7.3 %
MCH RBC QN AUTO: 29.5 PG (ref 27–31)
MCHC RBC AUTO-ENTMCNC: 34.1 G/DL (ref 33–36)
MCV RBC AUTO: 86.7 FL (ref 80–94)
MONOCYTES # BLD AUTO: 1.26 X10(3)/MCL (ref 0.1–1.3)
MONOCYTES NFR BLD AUTO: 6.5 %
NEUTROPHILS # BLD AUTO: 16.39 X10(3)/MCL (ref 2.1–9.2)
NEUTROPHILS NFR BLD AUTO: 84.1 %
NRBC BLD AUTO-RTO: 0 %
PLATELET # BLD AUTO: 195 X10(3)/MCL (ref 130–400)
PMV BLD AUTO: 10.3 FL (ref 7.4–10.4)
RBC # BLD AUTO: 3.15 X10(6)/MCL (ref 4.2–5.4)
WBC # BLD AUTO: 19.47 X10(3)/MCL (ref 4.5–11.5)

## 2024-11-07 PROCEDURE — 85025 COMPLETE CBC W/AUTO DIFF WBC: CPT | Performed by: OBSTETRICS & GYNECOLOGY

## 2024-11-07 PROCEDURE — 72100002 HC LABOR CARE, 1ST 8 HOURS

## 2024-11-07 PROCEDURE — 36415 COLL VENOUS BLD VENIPUNCTURE: CPT | Performed by: OBSTETRICS & GYNECOLOGY

## 2024-11-07 PROCEDURE — 72100003 HC LABOR CARE, EA. ADDL. 8 HRS

## 2024-11-07 PROCEDURE — 25000003 PHARM REV CODE 250: Performed by: OBSTETRICS & GYNECOLOGY

## 2024-11-07 PROCEDURE — 11000001 HC ACUTE MED/SURG PRIVATE ROOM

## 2024-11-07 RX ORDER — IBUPROFEN 600 MG/1
600 TABLET ORAL EVERY 6 HOURS
Qty: 60 TABLET | Refills: 2 | Status: SHIPPED | OUTPATIENT
Start: 2024-11-07 | End: 2024-11-08

## 2024-11-07 RX ADMIN — IBUPROFEN 600 MG: 600 TABLET, FILM COATED ORAL at 04:11

## 2024-11-07 RX ADMIN — IBUPROFEN 600 MG: 600 TABLET, FILM COATED ORAL at 10:11

## 2024-11-07 RX ADMIN — DOCUSATE SODIUM 100 MG: 100 CAPSULE, LIQUID FILLED ORAL at 08:11

## 2024-11-07 NOTE — L&D DELIVERY NOTE
"[facility]  [facility]   Operative Note    SUMMARY     Normal spontaneous vaginal delivery of live infant, skin to skin was unable to be performed due to prematurity .  Infant delivered position OA over intact perineum.  Nuchal cord: No.    Spontaneous delivery of placenta and IV pitocin given noting good uterine tone.  No lacerations noted.  Patient tolerated delivery well. Sponge needle and lap counted correctly x2.    Indications: 29 weeks gestation of pregnancy  Pregnancy complicated by:   Patient Active Problem List   Diagnosis     labor    Positive fetal fibronectin at 22 weeks to 34 weeks gestation    29 weeks gestation of pregnancy    Short cervix affecting pregnancy     premature rupture of membranes    Non-reassuring fetal heart rate or rhythm affecting mother     (spontaneous vaginal delivery)     Admitting GA: 30w4d    Delivery Information for Neal Ron    Birth information:  YOB: 2022   Time of birth: 8:21 PM   Sex: male   Head Delivery Date/Time: 2024  8:21 PM   Delivery type: Vaginal, Spontaneous   Gestational Age: 30w4d       Delivery Providers    Delivering clinician: Tong Burgess MD   Provider Role    Kallie Boyce RN Garcia, Molly, RN Prather, Lisa, Tong Kebede, Evelia Jolly ST Hanks, Marie B, NNP Nurse Practitioner              Measurements    Weight: 1654 g  Weight (lbs): 3 lb 10.3 oz  Length: 41 cm  Length (in): 16.14"  Head circumference: 28 cm         Apgars    Living status: Living  Apgar Component Scores:  1 min.:  5 min.:  10 min.:  15 min.:  20 min.:    Skin color:  0  1       Heart rate:  1  2       Reflex irritability:  1  1       Muscle tone:  0  1       Respiratory effort:  1  2       Total:  3  7       Apgars assigned by: RONALDO MOFFETT         Operative Delivery    Forceps attempted?: No  Vacuum extractor attempted?: No         Shoulder Dystocia    Shoulder " dystocia present?: No           Presentation    Presentation: Vertex  Position: Occiput Anterior           Interventions/Resuscitation    Method: Tactile Stimulation, CPAP, PPV, Intubation, Surfactant, NICU Attended       Cord    Vessels: 3 vessels  Complications: None  Delayed Cord Clamping?: No  Cord Blood Disposition: Sent with Baby  Gases Sent?: No  Stem Cell Collection (by MD): No       Placenta    Placenta delivery date/time: 2024  Placenta removal: Expressed  Placenta appearance: Intact  Placenta disposition: Pathology           Labor Events:       labor: Yes     Labor Onset Date/Time:         Dilation Complete Date/Time: 2024 20:08     Start Pushing Date/Time: 2024 20:20     Rupture Date/Time: 10/31/24 0202        Rupture type: PPROM (Premature Prolonged Rupture)        Fluid Amount:       Fluid Color: Clear      steroids: Full Course     Antibiotics given for GBS: Yes     Induction:       Indications for induction:        Augmentation: oxytocin     Indications for augmentation:       Labor complications:       Additional complications:          Cervical ripening:                     Delivery:      Episiotomy: None     Indication for Episiotomy:       Perineal Lacerations: None Repaired:      Periurethral Laceration:   Repaired:     Labial Laceration:   Repaired:     Sulcus Laceration:   Repaired:     Vaginal Laceration:   Repaired:     Cervical Laceration:   Repaired:     Repair suture: None     Repair # of packets:       Last Value - EBL - Nursing (mL):       Sum - EBL - Nursing (mL): 0     Last Value - EBL - Anesthesia (mL):      Calculated QBL (mL): 100     Running total QBL (mL): 100     Vaginal Sweep Performed: No     Surgicount Correct: Yes       Other providers:       Anesthesia    Method: Epidural          Details (if applicable):  Trial of Labor      Categorization:      Priority:     Indications for :     Incision Type:        Additional  information:  Forceps:    Vacuum:    Breech:    Observed anomalies    Other (Comments):

## 2024-11-07 NOTE — PLAN OF CARE
"  Problem: Adult Inpatient Plan of Care  Goal: Plan of Care Review  Outcome: Progressing  Goal: Patient-Specific Goal (Individualized)  Description: "I want to visit my baby in the NICU"  Outcome: Progressing  Goal: Absence of Hospital-Acquired Illness or Injury  Outcome: Progressing  Goal: Optimal Comfort and Wellbeing  Outcome: Progressing  Goal: Readiness for Transition of Care  Outcome: Progressing     Problem:  Fall Injury Risk  Goal: Absence of Fall, Infant Drop and Related Injury  Outcome: Progressing     Problem: Infection  Goal: Absence of Infection Signs and Symptoms  Outcome: Progressing     Problem: Postpartum (Vaginal Delivery)  Goal: Successful Parent Role Transition  Outcome: Progressing  Goal: Hemostasis  Outcome: Progressing  Goal: Absence of Infection Signs and Symptoms  Outcome: Progressing  Goal: Anesthesia/Sedation Recovery  Outcome: Progressing  Goal: Optimal Pain Control and Function  Outcome: Progressing  Goal: Effective Urinary Elimination  Outcome: Progressing     Problem: Breastfeeding  Goal: Effective Breastfeeding  Outcome: Progressing     "

## 2024-11-07 NOTE — PLAN OF CARE
"  Problem: Adult Inpatient Plan of Care  Goal: Plan of Care Review  Outcome: Progressing  Goal: Patient-Specific Goal (Individualized)  Description: "I want to visit my baby in the NICU"  Outcome: Progressing  Flowsheets (Taken 2024)  Patient/Family-Specific Goals (Include Timeframe): " I want to manage my pain"  Goal: Absence of Hospital-Acquired Illness or Injury  Outcome: Progressing  Goal: Optimal Comfort and Wellbeing  Outcome: Progressing  Goal: Readiness for Transition of Care  Outcome: Progressing     Problem:  Fall Injury Risk  Goal: Absence of Fall, Infant Drop and Related Injury  Outcome: Progressing     Problem: Infection  Goal: Absence of Infection Signs and Symptoms  Outcome: Progressing     Problem:  Labor  Goal: Delayed  Delivery  Outcome: Progressing     Problem: Labor  Goal: Hemostasis  Outcome: Progressing  Goal: Stable Fetal Wellbeing  Outcome: Progressing  Goal: Effective Progression to Delivery  Outcome: Progressing  Goal: Absence of Infection Signs and Symptoms  Outcome: Progressing  Goal: Acceptable Pain Control  Outcome: Progressing  Goal: Normal Uterine Contraction Pattern  Outcome: Progressing     Problem: Postpartum (Vaginal Delivery)  Goal: Successful Parent Role Transition  Outcome: Progressing  Goal: Hemostasis  Outcome: Progressing  Goal: Absence of Infection Signs and Symptoms  Outcome: Progressing  Goal: Anesthesia/Sedation Recovery  Outcome: Progressing  Goal: Optimal Pain Control and Function  Outcome: Progressing  Goal: Effective Urinary Elimination  Outcome: Progressing     Problem: Breastfeeding  Goal: Effective Breastfeeding  Outcome: Progressing     Problem: Breastfeeding  Goal: Effective Breastfeeding  Outcome: Progressing     "

## 2024-11-07 NOTE — PROGRESS NOTES
Copied from baby's chart:  .. Admit Assessment    Patient Identification  Neal Ron   :  2022  Admit Date:  2024  Attending Provider:  Ned Valenzuela Jr.,*              Referral:   Pt was admitted to NICU with a diagnosis of   infant of 30 completed weeks of gestation, and was admitted this hospital stay due to   infant of 30 completed weeks of gestation [P07.33].   is involved and patient was referred to the Social Work Department via Routine NICU consult.        Verified her face sheet information:      Living Situation:      Resides at [address] [place] [address] [address] [id number] [address], phone: [phone] (home).         Met with mother, alone, in her postpartum room to complete new NICU admit social assessment. Mother was easily engaged and appropriate throughout assessment. Baby's Name: Ben Ron. FOB: Joshua Ron (involved). Mother reports to living at above confirmed address with FOB/ and their 3y son. Provided mother with LCSW contact info along with parent resource packet.       OB: Dr Aditya Turner: Dr Woo    Confirmed Supplies: confirmed to having a carseat and safe sleep     History/Current Symptoms of Anxiety/Depression: reports hx of anxiety, reports to being prescribed zoloft by Dr Treviño at [facility]   Discussed PPD and identifying symptoms and provided mom with PPD counseling resources and symptom brochure.       Identified Support:  reports to having a sufficient support system     History/Current Substance Use:  no indications     Indications of Abuse/Neglect:   no indications        Emotional/Behavioral/Cognitive Issues: none observed at the time of assessment      Current RX Prescriptions: zoloft     Adequate Discharge/Visiting Transportation: yes     VY Signed/Filed: yes     Qualifies:   Early steps: yes  SSI: no     NICU Assessment completed in Flowsheets: yes            Plan:  will follow family throughout baby's stay in NICU for any needs         24 1451   NICU Assessment   Assessment Type Discharge Planning Assessment   Source of Information family   Verified Demographic and Insurance Information Yes   Insurance Commercial   [employer]   Lives With mother;father;brother   Name(s) of People in Home Gretchen Ron, Joshua Ron, 3ymale   Number people in home 4 including baby   Relationship Status of Parents    Primary Source of Support/Comfort parent   Other children (include names and ages) 3ymale   Father's Involvement Fully Involved   Is Father signing the birth certificate Yes   Father Name and  Joshua Ron   Family Involvement High   Primary Contact Name and Number Gretchen Ron [phone]   Other Contacts Names and Numbers Joshua Ron [phone]   Infant Feeding Plan breastfeeding   Does baby have crib or safe sleep space? Yes   Do you have a car seat? Yes   Resource/Environmental Concerns none   Environment Concerns none   Resources/Education Provided Preparing for Your Baby's Discharge Home;Support Resources for NICU Families;Early Intervention Program;Post Partum Depression   DME Needed Upon Discharge  none   DCFS No indications (Indicators for Report)   Discharge Plan A Home with family

## 2024-11-07 NOTE — ANESTHESIA POSTPROCEDURE EVALUATION
Anesthesia Post Evaluation    Patient: Gretchen Ron    Procedure(s) Performed: * No procedures listed *    Final Anesthesia Type: epidural      Patient location during evaluation: PACU  Patient participation: Yes- Able to Participate  Level of consciousness: awake and alert and oriented  Post-procedure vital signs: reviewed and stable  Pain management: adequate  Airway patency: patent  EVAN mitigation strategies: Use of major conduction anesthesia (spinal/epidural) or peripheral nerve block  PONV status at discharge: No PONV  Anesthetic complications: no      Cardiovascular status: blood pressure returned to baseline and stable  Respiratory status: unassisted  Hydration status: euvolemic  Follow-up not needed.  Comments: EPIDURAL BLK RESOLVED , SENSORY MOTOR INTACT, DENIES HEADACHE.                Vitals Value Taken Time   /65 11/07/24 0013   Temp 37.1 °C (98.8 °F) 11/07/24 0013   Pulse 75 11/07/24 0013   Resp 18 11/07/24 0013   SpO2 95 % 11/07/24 0013         No case tracking events are documented in the log.      Pain/Wong Score: Pain Rating Prior to Med Admin: 0 (11/7/2024  4:24 AM)

## 2024-11-07 NOTE — NURSING
Disconnected efm and toco monitors at 2012 to transfer to OR3 for delivery due to fetal prematurity. SHIKHA Boyce RN and RESHMA Bear RN transferred patient to OR.

## 2024-11-08 VITALS
HEIGHT: 65 IN | BODY MASS INDEX: 29.16 KG/M2 | DIASTOLIC BLOOD PRESSURE: 77 MMHG | WEIGHT: 175 LBS | SYSTOLIC BLOOD PRESSURE: 118 MMHG | OXYGEN SATURATION: 98 % | HEART RATE: 73 BPM | RESPIRATION RATE: 14 BRPM | TEMPERATURE: 98 F

## 2024-11-08 LAB
BACTERIA TISS AEROBE CULT: ABNORMAL
GROUP & RH: NORMAL
INDIRECT COOMBS: NORMAL
PSYCHE PATHOLOGY RESULT: NORMAL
SPECIMEN OUTDATE: NORMAL

## 2024-11-08 PROCEDURE — 86900 BLOOD TYPING SEROLOGIC ABO: CPT | Performed by: OBSTETRICS & GYNECOLOGY

## 2024-11-08 PROCEDURE — 25000003 PHARM REV CODE 250: Performed by: OBSTETRICS & GYNECOLOGY

## 2024-11-08 PROCEDURE — 36415 COLL VENOUS BLD VENIPUNCTURE: CPT | Performed by: OBSTETRICS & GYNECOLOGY

## 2024-11-08 RX ORDER — IBUPROFEN 600 MG/1
600 TABLET ORAL EVERY 6 HOURS
Qty: 60 TABLET | Refills: 2 | Status: SHIPPED | OUTPATIENT
Start: 2024-11-08

## 2024-11-08 RX ADMIN — IBUPROFEN 600 MG: 600 TABLET, FILM COATED ORAL at 04:11

## 2024-11-08 RX ADMIN — IBUPROFEN 600 MG: 600 TABLET, FILM COATED ORAL at 10:11

## 2024-11-08 RX ADMIN — DOCUSATE SODIUM 100 MG: 100 CAPSULE, LIQUID FILLED ORAL at 08:11

## 2024-11-08 NOTE — PLAN OF CARE
"  Problem: Adult Inpatient Plan of Care  Goal: Plan of Care Review  Outcome: Met  Goal: Patient-Specific Goal (Individualized)  Description: "I want to visit my baby in the NICU"  Outcome: Met  Goal: Absence of Hospital-Acquired Illness or Injury  Outcome: Met  Goal: Optimal Comfort and Wellbeing  Outcome: Met  Goal: Readiness for Transition of Care  Outcome: Met     Problem:  Fall Injury Risk  Goal: Absence of Fall, Infant Drop and Related Injury  Outcome: Met     Problem: Infection  Goal: Absence of Infection Signs and Symptoms  Outcome: Met     Problem:  Labor  Goal: Delayed  Delivery  Outcome: Met     Problem: Labor  Goal: Hemostasis  Outcome: Met  Goal: Stable Fetal Wellbeing  Outcome: Met  Goal: Effective Progression to Delivery  Outcome: Met  Goal: Absence of Infection Signs and Symptoms  Outcome: Met  Goal: Acceptable Pain Control  Outcome: Met  Goal: Normal Uterine Contraction Pattern  Outcome: Met     Problem: Postpartum (Vaginal Delivery)  Goal: Successful Parent Role Transition  Outcome: Met  Goal: Hemostasis  Outcome: Met  Goal: Absence of Infection Signs and Symptoms  Outcome: Met  Goal: Anesthesia/Sedation Recovery  Outcome: Met  Goal: Optimal Pain Control and Function  Outcome: Met  Goal: Effective Urinary Elimination  Outcome: Met     Problem: Breastfeeding  Goal: Effective Breastfeeding  Outcome: Met     Problem: Breastfeeding  Goal: Effective Breastfeeding  Outcome: Met     "

## 2024-11-08 NOTE — DISCHARGE SUMMARY
[facility] - 3rd Floor Mother/Baby Unit  Obstetrics  Discharge Summary      Patient Name: Gretchen Ron  MRN: [id number]  Admission Date: 10/30/2024  Hospital Length of Stay: 7 days  Discharge Date and Time:  2024 7:04 AM  Attending Physician: Tong Willson MD   Discharging Provider: Tong Willson MD  Primary Care Provider: Kenzie Martinez MD    HPI: admitted with short cervix and SROM at 29 weeks. Had spontaneous delivery Wednesday evening.     * No surgery found *     Hospital Course: extended admit for PPROM with spontaneoous vaginal delivery , Sandra post partum recovery    Consults (From admission, onward)          Status Ordering Provider     Inpatient consult to Anesthesiology  Once        Provider:  Alo Arias MD    Acknowledged TONG WILLSON     Inpatient consult to Neonatology  Once        Provider:  Ned Valenzuela Jr., MD    Acknowledged TONG WILLSON     Inpatient consult to PICC team ([facility])  Once        Provider:  (Not yet assigned)    JERRI Doll     Inpatient consult to Neonatology  Once        Provider:  Bhavya Kuhn MD    Completed TONG WILLSON     Inpatient consult to Maternal Fetal Medicine  Once        Provider:  Jerri Tate MD    Completed TONG WILLSON            Final Active Diagnoses:    Diagnosis Date Noted POA    PRINCIPAL PROBLEM:  29 weeks gestation of pregnancy [Z3A.29] 10/28/2024 Not Applicable    Non-reassuring fetal heart rate or rhythm affecting mother [O36.8390] 2024 Unknown     (spontaneous vaginal delivery) [O80] 2024 Not Applicable     premature rupture of membranes [O42.919] 10/31/2024 No    Short cervix affecting pregnancy [O26.879] 10/30/2024 Yes     labor [O60.00] 10/27/2024 Yes    Positive fetal fibronectin at 22 weeks to 34 weeks gestation [O09.899, R87.89] 10/27/2024 Not Applicable      Problems Resolved During this Admission:        Labs: CBC  "  Recent Labs   Lab 24  0345   WBC 19.47*   HGB 9.3*   HCT 27.3*          Feeding Method: breast    Immunizations       Date Immunization Status Dose Route/Site Given by    24 MMR Incomplete 0.5 mL Subcutaneous/     24 Tdap Incomplete 0.5 mL Intramuscular/             Delivery:    Episiotomy: None   Lacerations: None   Repair suture: None   Repair # of packets:     Blood loss (ml):       Birth information:  YOB: 2022   Time of birth: 8:21 PM   Sex: male   Delivery type: Vaginal, Spontaneous   Gestational Age: 30w4d     Measurements    Weight: 1654 g  Weight (lbs): 3 lb 10.3 oz  Length: 41 cm  Length (in): 16.14"  Head circumference: 28 cm         Delivery Clinician: Delivery Providers    Delivering clinician: Tong Burgess MD   Provider Role    Kallie Boyce, Norma Mcqueen, Toya Singh, Tong Kebede, Evelia Jolly ST Hanks, Marie B, NNP Nurse Practitioner             Additional  information:  Forceps:    Vacuum:    Breech:    Observed anomalies      Living?:     Apgars    Living status: Living  Apgar Component Scores:  1 min.:  5 min.:  10 min.:  15 min.:  20 min.:    Skin color:  0  1       Heart rate:  1  2       Reflex irritability:  1  1       Muscle tone:  0  1       Respiratory effort:  1  2       Total:  3  7       Apgars assigned by: RONALDO MOFFETT         Placenta: Delivered:       appearance  Pending Diagnostic Studies:       Procedure Component Value Units Date/Time    Specimen to Pathology Gynecology and Obstetrics [[id number]] Collected: 24    Order Status: Sent Lab Status: In process Updated: 24    Specimen: Tissue             Discharged Condition: good    Disposition: Home or Self Care    Follow Up:    Patient Instructions:   No discharge procedures on file.  Medications:  Current Discharge Medication List        START taking these medications    Details   ibuprofen (ADVIL,MOTRIN) 600 " MG tablet Take 1 tablet (600 mg total) by mouth every 6 (six) hours.  Qty: 60 tablet, Refills: 2           CONTINUE these medications which have NOT CHANGED    Details   prenatal vit/iron fum/folic ac (PRENATAL 1+1 ORAL) Take by mouth.      sertraline (ZOLOFT) 50 MG tablet Take 50 mg by mouth.             Tong Burgess MD  Obstetrics  [facility] - 3rd Floor Mother/Baby Unit

## 2024-11-08 NOTE — PLAN OF CARE
"  Problem: Adult Inpatient Plan of Care  Goal: Plan of Care Review  Outcome: Progressing  Goal: Patient-Specific Goal (Individualized)  Description: "I want to visit my baby in the NICU"  Outcome: Progressing  Goal: Absence of Hospital-Acquired Illness or Injury  Outcome: Progressing  Goal: Optimal Comfort and Wellbeing  Outcome: Progressing  Goal: Readiness for Transition of Care  Outcome: Progressing     Problem:  Fall Injury Risk  Goal: Absence of Fall, Infant Drop and Related Injury  Outcome: Progressing     Problem: Infection  Goal: Absence of Infection Signs and Symptoms  Outcome: Progressing     Problem:  Labor  Goal: Delayed  Delivery  Outcome: Progressing     Problem: Postpartum (Vaginal Delivery)  Goal: Successful Parent Role Transition  Outcome: Progressing  Goal: Hemostasis  Outcome: Progressing  Goal: Absence of Infection Signs and Symptoms  Outcome: Progressing  Goal: Anesthesia/Sedation Recovery  Outcome: Progressing  Goal: Optimal Pain Control and Function  Outcome: Progressing  Goal: Effective Urinary Elimination  Outcome: Progressing     Problem: Breastfeeding  Goal: Effective Breastfeeding  Outcome: Progressing     "

## 2024-11-09 LAB — BACTERIA SPEC ANAEROBE CULT: NORMAL

## 2025-05-14 ENCOUNTER — OFFICE VISIT (OUTPATIENT)
Dept: INTERNAL MEDICINE | Facility: CLINIC | Age: 35
End: 2025-05-14
Payer: COMMERCIAL

## 2025-05-14 VITALS
HEART RATE: 82 BPM | RESPIRATION RATE: 16 BRPM | SYSTOLIC BLOOD PRESSURE: 126 MMHG | OXYGEN SATURATION: 98 % | BODY MASS INDEX: 27.49 KG/M2 | DIASTOLIC BLOOD PRESSURE: 82 MMHG | TEMPERATURE: 98 F | HEIGHT: 65 IN | WEIGHT: 165 LBS

## 2025-05-14 DIAGNOSIS — Z13.29 SCREENING FOR HYPOTHYROIDISM: ICD-10-CM

## 2025-05-14 DIAGNOSIS — R06.00 DYSPNEA, UNSPECIFIED TYPE: ICD-10-CM

## 2025-05-14 DIAGNOSIS — R22.1 LOCALIZED SWELLING, MASS AND LUMP, NECK: ICD-10-CM

## 2025-05-14 DIAGNOSIS — N92.6 ABNORMAL MENSES: ICD-10-CM

## 2025-05-14 DIAGNOSIS — E66.3 OVERWEIGHT (BMI 25.0-29.9): ICD-10-CM

## 2025-05-14 DIAGNOSIS — Z00.00 WELL ADULT EXAM: Primary | ICD-10-CM

## 2025-05-14 DIAGNOSIS — K59.00 CONSTIPATION, UNSPECIFIED CONSTIPATION TYPE: ICD-10-CM

## 2025-05-14 DIAGNOSIS — E55.9 VITAMIN D DEFICIENCY: ICD-10-CM

## 2025-05-14 DIAGNOSIS — R61 NIGHT SWEAT: ICD-10-CM

## 2025-05-14 PROBLEM — O36.8390 NON-REASSURING FETAL HEART RATE OR RHYTHM AFFECTING MOTHER: Status: RESOLVED | Noted: 2024-11-06 | Resolved: 2025-05-14

## 2025-05-14 PROBLEM — Z3A.29 29 WEEKS GESTATION OF PREGNANCY: Status: RESOLVED | Noted: 2024-10-28 | Resolved: 2025-05-14

## 2025-05-14 PROBLEM — O60.00 PRETERM LABOR: Status: RESOLVED | Noted: 2024-10-27 | Resolved: 2025-05-14

## 2025-05-14 PROBLEM — O09.899 POSITIVE FETAL FIBRONECTIN AT 22 WEEKS TO 34 WEEKS GESTATION: Status: RESOLVED | Noted: 2024-10-27 | Resolved: 2025-05-14

## 2025-05-14 PROBLEM — R87.89 POSITIVE FETAL FIBRONECTIN AT 22 WEEKS TO 34 WEEKS GESTATION: Status: RESOLVED | Noted: 2024-10-27 | Resolved: 2025-05-14

## 2025-05-14 PROBLEM — O42.919 PRETERM PREMATURE RUPTURE OF MEMBRANES: Status: RESOLVED | Noted: 2024-10-31 | Resolved: 2025-05-14

## 2025-05-14 PROBLEM — O26.879 SHORT CERVIX AFFECTING PREGNANCY: Status: RESOLVED | Noted: 2024-10-30 | Resolved: 2025-05-14

## 2025-05-14 LAB
25(OH)D3+25(OH)D2 SERPL-MCNC: 44 NG/ML (ref 30–80)
ALBUMIN SERPL-MCNC: 4 G/DL (ref 3.5–5)
ALBUMIN/GLOB SERPL: 1.2 RATIO (ref 1.1–2)
ALP SERPL-CCNC: 64 UNIT/L (ref 40–150)
ALT SERPL-CCNC: 14 UNIT/L (ref 0–55)
ANION GAP SERPL CALC-SCNC: 7 MEQ/L
AST SERPL-CCNC: 20 UNIT/L (ref 11–45)
BACTERIA #/AREA URNS AUTO: ABNORMAL /HPF
BASOPHILS # BLD AUTO: 0.02 X10(3)/MCL
BASOPHILS NFR BLD AUTO: 0.3 %
BILIRUB SERPL-MCNC: 0.2 MG/DL
BILIRUB UR QL STRIP.AUTO: NEGATIVE
BUN SERPL-MCNC: 16.8 MG/DL (ref 7–18.7)
CALCIUM SERPL-MCNC: 9 MG/DL (ref 8.4–10.2)
CHLORIDE SERPL-SCNC: 105 MMOL/L (ref 98–107)
CHOLEST SERPL-MCNC: 208 MG/DL
CHOLEST/HDLC SERPL: 3 {RATIO} (ref 0–5)
CLARITY UR: CLEAR
CO2 SERPL-SCNC: 25 MMOL/L (ref 22–29)
COLOR UR AUTO: ABNORMAL
CREAT SERPL-MCNC: 0.73 MG/DL (ref 0.55–1.02)
CREAT/UREA NIT SERPL: 23
EOSINOPHIL # BLD AUTO: 0.03 X10(3)/MCL (ref 0–0.9)
EOSINOPHIL NFR BLD AUTO: 0.4 %
ERYTHROCYTE [DISTWIDTH] IN BLOOD BY AUTOMATED COUNT: 12.4 % (ref 11.5–17)
EST. AVERAGE GLUCOSE BLD GHB EST-MCNC: 85.3 MG/DL
GFR SERPLBLD CREATININE-BSD FMLA CKD-EPI: >60 ML/MIN/1.73/M2
GLOBULIN SER-MCNC: 3.3 GM/DL (ref 2.4–3.5)
GLUCOSE SERPL-MCNC: 95 MG/DL (ref 74–100)
GLUCOSE UR QL STRIP: NORMAL
HBA1C MFR BLD: 4.6 %
HCT VFR BLD AUTO: 40.4 % (ref 37–47)
HDLC SERPL-MCNC: 65 MG/DL (ref 35–60)
HGB BLD-MCNC: 13.3 G/DL (ref 12–16)
HGB UR QL STRIP: NEGATIVE
IMM GRANULOCYTES # BLD AUTO: 0.03 X10(3)/MCL (ref 0–0.04)
IMM GRANULOCYTES NFR BLD AUTO: 0.4 %
KETONES UR QL STRIP: NEGATIVE
LDLC SERPL CALC-MCNC: 131 MG/DL (ref 50–140)
LEUKOCYTE ESTERASE UR QL STRIP: NEGATIVE
LYMPHOCYTES # BLD AUTO: 1.85 X10(3)/MCL (ref 0.6–4.6)
LYMPHOCYTES NFR BLD AUTO: 27.1 %
MCH RBC QN AUTO: 29.3 PG (ref 27–31)
MCHC RBC AUTO-ENTMCNC: 32.9 G/DL (ref 33–36)
MCV RBC AUTO: 89 FL (ref 80–94)
MONOCYTES # BLD AUTO: 0.45 X10(3)/MCL (ref 0.1–1.3)
MONOCYTES NFR BLD AUTO: 6.6 %
MUCOUS THREADS URNS QL MICRO: ABNORMAL /LPF
NEUTROPHILS # BLD AUTO: 4.45 X10(3)/MCL (ref 2.1–9.2)
NEUTROPHILS NFR BLD AUTO: 65.2 %
NITRITE UR QL STRIP: NEGATIVE
NRBC BLD AUTO-RTO: 0 %
PH UR STRIP: 5.5 [PH]
PLATELET # BLD AUTO: 256 X10(3)/MCL (ref 130–400)
PMV BLD AUTO: 10.4 FL (ref 7.4–10.4)
POTASSIUM SERPL-SCNC: 4.2 MMOL/L (ref 3.5–5.1)
PROT SERPL-MCNC: 7.3 GM/DL (ref 6.4–8.3)
PROT UR QL STRIP: NEGATIVE
RBC # BLD AUTO: 4.54 X10(6)/MCL (ref 4.2–5.4)
RBC #/AREA URNS AUTO: ABNORMAL /HPF
SODIUM SERPL-SCNC: 137 MMOL/L (ref 136–145)
SP GR UR STRIP.AUTO: 1.03 (ref 1–1.03)
SQUAMOUS #/AREA URNS LPF: ABNORMAL /HPF
TRIGL SERPL-MCNC: 61 MG/DL (ref 37–140)
TSH SERPL-ACNC: 1.43 UIU/ML (ref 0.35–4.94)
UROBILINOGEN UR STRIP-ACNC: NORMAL
VLDLC SERPL CALC-MCNC: 12 MG/DL
WBC # BLD AUTO: 6.83 X10(3)/MCL (ref 4.5–11.5)
WBC #/AREA URNS AUTO: ABNORMAL /HPF

## 2025-05-14 PROCEDURE — 3079F DIAST BP 80-89 MM HG: CPT | Mod: CPTII,,, | Performed by: INTERNAL MEDICINE

## 2025-05-14 PROCEDURE — 81001 URINALYSIS AUTO W/SCOPE: CPT | Performed by: INTERNAL MEDICINE

## 2025-05-14 PROCEDURE — 84443 ASSAY THYROID STIM HORMONE: CPT | Performed by: INTERNAL MEDICINE

## 2025-05-14 PROCEDURE — 80053 COMPREHEN METABOLIC PANEL: CPT | Performed by: INTERNAL MEDICINE

## 2025-05-14 PROCEDURE — 85025 COMPLETE CBC W/AUTO DIFF WBC: CPT | Performed by: INTERNAL MEDICINE

## 2025-05-14 PROCEDURE — 99385 PREV VISIT NEW AGE 18-39: CPT | Mod: ,,, | Performed by: INTERNAL MEDICINE

## 2025-05-14 PROCEDURE — 3008F BODY MASS INDEX DOCD: CPT | Mod: CPTII,,, | Performed by: INTERNAL MEDICINE

## 2025-05-14 PROCEDURE — 1159F MED LIST DOCD IN RCRD: CPT | Mod: CPTII,,, | Performed by: INTERNAL MEDICINE

## 2025-05-14 PROCEDURE — 1160F RVW MEDS BY RX/DR IN RCRD: CPT | Mod: CPTII,,, | Performed by: INTERNAL MEDICINE

## 2025-05-14 PROCEDURE — 82306 VITAMIN D 25 HYDROXY: CPT | Performed by: INTERNAL MEDICINE

## 2025-05-14 PROCEDURE — 3074F SYST BP LT 130 MM HG: CPT | Mod: CPTII,,, | Performed by: INTERNAL MEDICINE

## 2025-05-14 PROCEDURE — 80061 LIPID PANEL: CPT | Performed by: INTERNAL MEDICINE

## 2025-05-14 PROCEDURE — 83036 HEMOGLOBIN GLYCOSYLATED A1C: CPT | Performed by: INTERNAL MEDICINE

## 2025-05-14 RX ORDER — LISDEXAMFETAMINE DIMESYLATE 40 MG/1
40 CAPSULE ORAL EVERY MORNING
COMMUNITY
Start: 2025-04-16

## 2025-05-14 NOTE — PROGRESS NOTES
Internal Medicine    Patient ID: [id number]     Chief Complaint: Establish Care      HPI:     Gretchen Ron is a 34 y.o. female here today for a follow up.     Patient reports difficulty losing weight since giving birth 6 months ago, despite being active and eating a healthy diet. Her baseline adult weight was around 140 lbs currently at 165. After her first pregnancy, weight loss was rapid, but this has not been the case following her second pregnancy.    Patient notes mood swings related to her menstrual cycle, which she did not have before her recent pregnancy. She is currently taking Zoloft 50mg, which she feels is effective, and Vyvanse, which she restarted after returning to work post-pregnancy.    Patient reports constant fatigue, which could be related to having a 6-month-old or potentially to iron deficiency, etc. She has random episodes of shortness of breath, particularly when performing quick tasks around the house, though she can complete a 2-mile walk without problems.    Patient mentions night sweats of unknown cause. She is not  breastfeeding.    Patient reports swelling in her lymph nodes for about 2 weeks. She has a tender lymph node on the back of her neck and another swelling near her skull that increases in size towards the end of the day. These lymph nodes are not associated with any recent illnesses, infections, or other symptoms.    Patient has constipation, stating that she has infrequent bowel movements and hard stools. She eats vegetables daily but admits she could increase her water intake. She has been taking magnesium oxide (2 pills nightly) for this issue, but it has not fully resolved the problem.    Patient denies fever, chills, sore throat, earaches, or any recent infections or illnesses. She denies having allergies or taking allergy medications. Patient denies any chest pain.    TEST RESULTS:  During her hospital stay, the patient's white blood cell count was high. Her hemoglobin  "levels were low, indicating anemia. On the day of premature labor, her glucose tolerance test was high enough to require a 3-hour test.    SOCIAL HISTORY:  Occupation:  at [employer]        Past Medical History:   Diagnosis Date    ADHD (attention deficit hyperactivity disorder)     Anxiety         History reviewed. No pertinent surgical history.     Social History     Tobacco Use    Smoking status: Never    Smokeless tobacco: Never   Substance and Sexual Activity    Alcohol use: Yes     Alcohol/week: 8.0 standard drinks of alcohol     Types: 3 Glasses of wine, 3 Cans of beer, 2 Drinks containing 0.5 oz of alcohol per week    Drug use: Never    Sexual activity: Yes     Partners: Male     Birth control/protection: None        Current Outpatient Medications   Medication Instructions    lisdexamfetamine (VYVANSE) 40 mg, Every morning    sertraline (ZOLOFT) 50 mg       Review of patient's allergies indicates:   Allergen Reactions    Cefixime Hives    Clarithromycin Hives    Penicillins Hives    Shrimp Hives    Sulfa (sulfonamide antibiotics)         Patient Care Team:  Kurt Lobato MD as PCP - General (Internal Medicine)  Tong Burgess MD as Consulting Physician (Obstetrics and Gynecology)     Subjective:     Review of Systems    12 point review of systems conducted, negative except as stated in the history of present illness. See HPI for details.    Objective:     Visit Vitals  /82 (BP Location: Left arm, Patient Position: Sitting)   Pulse 82   Temp 97.6 °F (36.4 °C) (Temporal)   Resp 16   Ht 5' 5" (1.651 m)   Wt 74.8 kg (165 lb)   LMP 04/16/2025   SpO2 98%   Breastfeeding No   BMI 27.46 kg/m²       Physical Exam  Constitutional:       Appearance: Normal appearance.   HENT:      Head: Normocephalic and atraumatic.      Comments: + post aricular adenopathy on the right, supraclavicular adenopathy- mobile  Eyes:      Extraocular Movements: Extraocular movements intact.      Pupils: " "Pupils are equal, round, and reactive to light.   Cardiovascular:      Rate and Rhythm: Normal rate and regular rhythm.   Pulmonary:      Effort: Pulmonary effort is normal.      Breath sounds: Normal breath sounds.   Skin:     General: Skin is warm and dry.   Neurological:      General: No focal deficit present.      Mental Status: She is alert.   Psychiatric:         Mood and Affect: Mood normal.         Labs Reviewed:     Chemistry:  Lab Results   Component Value Date    MG 3.60 (H) 10/28/2024        No results found for: "HGBA1C", "MICROALBCREA"     Hematology:  Lab Results   Component Value Date    WBC 19.47 (H) 11/07/2024    HGB 9.3 (L) 11/07/2024    HCT 27.3 (L) 11/07/2024     11/07/2024       Lipid Panel:  No results found for: "CHOL", "HDL", "LDL", "TRIG", "TOTALCHOLEST"     Urine:  Lab Results   Component Value Date    APPEARANCEUA Turbid (A) 10/27/2024    SGUA 1.006 10/27/2024    PROTEINUA Negative 10/27/2024    KETONESUA Negative 10/27/2024    LEUKOCYTESUR 250 (A) 10/27/2024    RBCUA 0-5 10/27/2024    WBCUA 11-20 (A) 10/27/2024    BACTERIA None Seen 10/27/2024    SQEPUA Trace 10/27/2024        Assessment and Plan:     Assessment & Plan    R22.1 Localized swelling, mass and lump, neck  K59.00 Constipation, unspecified constipation type  R06.00 Dyspnea, unspecified type  N92.6 Abnormal menses  Z00.00 Well adult exam  R61 Night sweat  E66.3 Overweight (BMI 25.0-29.9)    IMPRESSION:   Assessed weight loss difficulties and associated symptoms.   Considered potential underlying causes for weight loss resistance, including thyroid issues, insulin resistance, and iron deficiency.   Evaluated reported shortness of breath, potentially related to anemia.   Determined need for US to further investigate neck swelling.      R22.1 LOCALIZED SWELLING, MASS AND LUMP, NECK:  - Ordered neck US to investigate swelling.    K59.00 CONSTIPATION, UNSPECIFIED CONSTIPATION TYPE:  - Patient to increase water intake to help " with constipation.  - Consider starting Miralax for constipation.    Z00.00 WELL ADULT EXAM:  - Discussed the difference between circulating iron (measured by iron profile) and stored iron (measured by ferritin).  - Explained that anemia can result from various deficiencies, including iron, B12  - Ordered comprehensive lab work including iron studies, thyroid function, and metabolic panel.    E66.3 OVERWEIGHT (BMI 25.0-29.9):  - Consider starting Adipex for weight loss after ruling out metabolic issues. Patient is on Vyvanse- we could uptirate that to 60mg instead. Consider GLP1 but would be cash price based on BMI         No follow-ups on file. In addition to their scheduled follow up, the patient has also been instructed to follow up on as needed basis.     Future Appointments   Date Time Provider Department Center   6/1/2026 10:20 AM Kurt Lobato MD [id number]        Kurt Lobato MD

## 2025-05-16 ENCOUNTER — RESULTS FOLLOW-UP (OUTPATIENT)
Dept: INTERNAL MEDICINE | Facility: CLINIC | Age: 35
End: 2025-05-16
Payer: COMMERCIAL

## 2025-05-16 ENCOUNTER — PATIENT MESSAGE (OUTPATIENT)
Dept: INTERNAL MEDICINE | Facility: CLINIC | Age: 35
End: 2025-05-16
Payer: COMMERCIAL

## 2025-05-16 DIAGNOSIS — E66.3 OVERWEIGHT (BMI 25.0-29.9): Primary | ICD-10-CM

## 2025-05-16 RX ORDER — TIRZEPATIDE 2.5 MG/.5ML
2.5 INJECTION, SOLUTION SUBCUTANEOUS
Qty: 2 ML | Refills: 2 | Status: SHIPPED | OUTPATIENT
Start: 2025-05-16

## 2025-05-16 NOTE — PROGRESS NOTES
Your labs have been reviewed.  All of these labs look excellent  In regards to weight loss if she would like to do a G LP 1 we could do Zepbound 2.5 it is 349 a month for the 1st month and 499 thereafter.   I thought about doing Adipex but because she is on Vyvanse we can not use a secondary stimulant  What she could consider is increasing the dose of the Vyvanse usually for weight loss we target near the 50- 60 mg range and she is on 40mg

## 2025-06-01 ENCOUNTER — PATIENT MESSAGE (OUTPATIENT)
Dept: INTERNAL MEDICINE | Facility: CLINIC | Age: 35
End: 2025-06-01
Payer: COMMERCIAL

## 2025-06-01 DIAGNOSIS — R22.1 LOCALIZED SWELLING, MASS AND LUMP, NECK: Primary | ICD-10-CM

## 2025-06-04 ENCOUNTER — HOSPITAL ENCOUNTER (OUTPATIENT)
Dept: RADIOLOGY | Facility: HOSPITAL | Age: 35
Discharge: HOME | End: 2025-06-04
Attending: INTERNAL MEDICINE
Payer: COMMERCIAL

## 2025-06-04 DIAGNOSIS — R22.1 LOCALIZED SWELLING, MASS AND LUMP, NECK: ICD-10-CM

## 2025-06-04 PROCEDURE — 76536 US EXAM OF HEAD AND NECK: CPT | Mod: TC

## 2025-06-05 ENCOUNTER — RESULTS FOLLOW-UP (OUTPATIENT)
Dept: INTERNAL MEDICINE | Facility: CLINIC | Age: 35
End: 2025-06-05
Payer: COMMERCIAL

## 2025-06-06 ENCOUNTER — PATIENT MESSAGE (OUTPATIENT)
Dept: INTERNAL MEDICINE | Facility: CLINIC | Age: 35
End: 2025-06-06
Payer: COMMERCIAL

## 2025-06-06 ENCOUNTER — TELEPHONE (OUTPATIENT)
Dept: INTERNAL MEDICINE | Facility: CLINIC | Age: 35
End: 2025-06-06
Payer: COMMERCIAL